# Patient Record
Sex: MALE | Race: WHITE | Employment: OTHER | URBAN - METROPOLITAN AREA
[De-identification: names, ages, dates, MRNs, and addresses within clinical notes are randomized per-mention and may not be internally consistent; named-entity substitution may affect disease eponyms.]

---

## 2018-01-05 ENCOUNTER — INITIAL CONSULT (OUTPATIENT)
Dept: CARDIOLOGY CLINIC | Age: 53
End: 2018-01-05

## 2018-01-05 ENCOUNTER — HOSPITAL ENCOUNTER (OUTPATIENT)
Dept: GENERAL RADIOLOGY | Age: 53
Discharge: HOME | End: 2018-01-05
Attending: INTERNAL MEDICINE | Admitting: INTERNAL MEDICINE

## 2018-01-05 VITALS
SYSTOLIC BLOOD PRESSURE: 126 MMHG | WEIGHT: 315 LBS | BODY MASS INDEX: 38.36 KG/M2 | HEIGHT: 76 IN | DIASTOLIC BLOOD PRESSURE: 82 MMHG | HEART RATE: 62 BPM

## 2018-01-05 DIAGNOSIS — R42 ORTHOSTATIC DIZZINESS: ICD-10-CM

## 2018-01-05 DIAGNOSIS — R07.2 PRECORDIAL PAIN: ICD-10-CM

## 2018-01-05 DIAGNOSIS — I48.19 PERSISTENT ATRIAL FIBRILLATION (HCC): Primary | ICD-10-CM

## 2018-01-05 LAB
BASOPHILS ABSOLUTE: 0 K/CU MM
BASOPHILS ABSOLUTE: ABNORMAL /ΜL
BASOPHILS RELATIVE PERCENT: 0.4 % (ref 0–1)
BASOPHILS RELATIVE PERCENT: ABNORMAL %
CHOLESTEROL: 241 MG/DL
DIFFERENTIAL TYPE: ABNORMAL
EOSINOPHILS ABSOLUTE: 0.2 K/CU MM
EOSINOPHILS ABSOLUTE: ABNORMAL /ΜL
EOSINOPHILS RELATIVE PERCENT: 2 % (ref 0–3)
EOSINOPHILS RELATIVE PERCENT: ABNORMAL %
HCT VFR BLD CALC: 35.2 % (ref 42–52)
HCT VFR BLD CALC: 36.2 % (ref 41–53)
HDLC SERPL-MCNC: 46 MG/DL
HEMOGLOBIN: 11.2 GM/DL (ref 13.5–18)
HEMOGLOBIN: 11.7 G/DL (ref 13.5–17.5)
IMMATURE NEUTROPHIL %: 0.4 % (ref 0–0.43)
IRON: 47 UG/DL (ref 59–158)
LDL CHOLESTEROL DIRECT: 169 MG/DL
LYMPHOCYTES ABSOLUTE: 1.8 K/CU MM
LYMPHOCYTES ABSOLUTE: ABNORMAL /ΜL
LYMPHOCYTES RELATIVE PERCENT: 21.4 % (ref 24–44)
LYMPHOCYTES RELATIVE PERCENT: ABNORMAL %
MCH RBC QN AUTO: 27.9 PG (ref 27–31)
MCH RBC QN AUTO: ABNORMAL PG
MCHC RBC AUTO-ENTMCNC: 31.8 % (ref 32–36)
MCHC RBC AUTO-ENTMCNC: ABNORMAL G/DL
MCV RBC AUTO: 87.8 FL (ref 78–100)
MCV RBC AUTO: ABNORMAL FL
MONOCYTES ABSOLUTE: 0.5 K/CU MM
MONOCYTES ABSOLUTE: ABNORMAL /ΜL
MONOCYTES RELATIVE PERCENT: 6.2 % (ref 0–4)
MONOCYTES RELATIVE PERCENT: ABNORMAL %
NEUTROPHILS ABSOLUTE: ABNORMAL /ΜL
NEUTROPHILS RELATIVE PERCENT: ABNORMAL %
NUCLEATED RBC %: 0 %
PCT TRANSFERRIN: 12 % (ref 10–44)
PDW BLD-RTO: 13.8 % (ref 11.7–14.9)
PLATELET # BLD: 368 K/CU MM (ref 140–440)
PLATELET # BLD: 374 K/ΜL
PMV BLD AUTO: 9.6 FL (ref 7.5–11.1)
PMV BLD AUTO: ABNORMAL FL
RBC # BLD: 4.01 M/CU MM (ref 4.6–6.2)
RBC # BLD: 4.2 10^6/ΜL
SEGMENTED NEUTROPHILS ABSOLUTE COUNT: 5.9 K/CU MM
SEGMENTED NEUTROPHILS RELATIVE PERCENT: 69.6 % (ref 36–66)
TOTAL IMMATURE NEUTOROPHIL: 0.03 K/CU MM
TOTAL IRON BINDING CAPACITY: 403 UG/DL (ref 250–450)
TOTAL NUCLEATED RBC: 0 K/CU MM
TRIGL SERPL-MCNC: 168 MG/DL
TSH SERPL DL<=0.05 MIU/L-ACNC: 1.98 UIU/ML
UNSATURATED IRON BINDING CAPACITY: 356 UG/DL (ref 110–370)
WBC # BLD: 8.5 K/CU MM (ref 4–10.5)
WBC # BLD: 8.95 10^3/ML

## 2018-01-05 PROCEDURE — 93000 ELECTROCARDIOGRAM COMPLETE: CPT | Performed by: INTERNAL MEDICINE

## 2018-01-05 PROCEDURE — 99204 OFFICE O/P NEW MOD 45 MIN: CPT | Performed by: INTERNAL MEDICINE

## 2018-01-05 NOTE — PROGRESS NOTES
medications of all above medical condition listed as is. Return in about 3 weeks (around 1/26/2018). Please note this report has been partially produced using speech recognition software and may contain errors related to that system including errors in grammar, punctuation, and spelling, as well as words and phrases that may be inappropriate.  If there are any questions or concerns please feel free to contact the dictating provider for clarification.

## 2018-01-08 ENCOUNTER — TELEPHONE (OUTPATIENT)
Dept: CARDIOLOGY CLINIC | Age: 53
End: 2018-01-08

## 2018-01-08 ENCOUNTER — PROCEDURE VISIT (OUTPATIENT)
Dept: CARDIOLOGY CLINIC | Age: 53
End: 2018-01-08

## 2018-01-08 DIAGNOSIS — R42 ORTHOSTATIC DIZZINESS: ICD-10-CM

## 2018-01-08 DIAGNOSIS — R07.2 PRECORDIAL PAIN: ICD-10-CM

## 2018-01-08 DIAGNOSIS — I48.19 PERSISTENT ATRIAL FIBRILLATION (HCC): Primary | ICD-10-CM

## 2018-01-08 DIAGNOSIS — I48.19 PERSISTENT ATRIAL FIBRILLATION (HCC): ICD-10-CM

## 2018-01-08 LAB
LV EF: 28 %
LV EF: 32 %
LVEF MODALITY: NORMAL
LVEF MODALITY: NORMAL

## 2018-01-08 PROCEDURE — 78452 HT MUSCLE IMAGE SPECT MULT: CPT | Performed by: INTERNAL MEDICINE

## 2018-01-08 PROCEDURE — 93306 TTE W/DOPPLER COMPLETE: CPT | Performed by: INTERNAL MEDICINE

## 2018-01-08 PROCEDURE — 93016 CV STRESS TEST SUPVJ ONLY: CPT | Performed by: INTERNAL MEDICINE

## 2018-01-08 PROCEDURE — A9500 TC99M SESTAMIBI: HCPCS | Performed by: INTERNAL MEDICINE

## 2018-01-08 PROCEDURE — 93018 CV STRESS TEST I&R ONLY: CPT | Performed by: INTERNAL MEDICINE

## 2018-01-08 PROCEDURE — 93017 CV STRESS TEST TRACING ONLY: CPT | Performed by: INTERNAL MEDICINE

## 2018-01-08 NOTE — TELEPHONE ENCOUNTER
Called the and talked with the patient about abn NM and Echo. Patient EF is low and he is scheduled to come and see Dr [email or web address].       Notes Recorded by Yared Garrison MD on 1/8/2018 at 3:12 PM EST  Needs to see me in office on Monday  To discuss plan , he will need cardaic cath , he should have seen GI to make sure he is not actively bleeding     NM Summary 1/8/18    A. Fib may affect EF results adversely    Supervising physician Dr. Tyler Morrissey .   Leopoldo Sas specific T wave inversion in lat leads but negative for ischemia by    diagnostic criteria    Abnormal reduced EF 32 % with global hypokinesis, patient was in afib during    exam    Moderate intensity medium size lat wall reversible defect in stress images    Fixed inferior defect consisted with diaphragmatic artifact    dilated left ventricle        Recommendation    Needs invasive work up and follow up in office for further discussion

## 2018-01-09 LAB
ALBUMIN SERPL-MCNC: 4.1 G/DL
ALP BLD-CCNC: 69 U/L
ALT SERPL-CCNC: 46 U/L
ANION GAP SERPL CALCULATED.3IONS-SCNC: NORMAL MMOL/L
AST SERPL-CCNC: 34 U/L
BILIRUB SERPL-MCNC: 0.7 MG/DL (ref 0.1–1.4)
BUN BLDV-MCNC: 14 MG/DL
CALCIUM SERPL-MCNC: 9.4 MG/DL
CHLORIDE BLD-SCNC: 103 MMOL/L
CHOLESTEROL, TOTAL: 233 MG/DL
CHOLESTEROL/HDL RATIO: ABNORMAL
CO2: 240 MMOL/L
CREAT SERPL-MCNC: 0.9 MG/DL
GFR CALCULATED: NORMAL
GLUCOSE BLD-MCNC: 76 MG/DL
HDLC SERPL-MCNC: 44 MG/DL (ref 35–70)
LDL CHOLESTEROL CALCULATED: 165 MG/DL (ref 0–160)
POTASSIUM SERPL-SCNC: 4.4 MMOL/L
SODIUM BLD-SCNC: 140 MMOL/L
TOTAL PROTEIN: 70
TRIGL SERPL-MCNC: 118 MG/DL
VLDLC SERPL CALC-MCNC: 24 MG/DL

## 2018-01-15 ENCOUNTER — OFFICE VISIT (OUTPATIENT)
Dept: CARDIOLOGY CLINIC | Age: 53
End: 2018-01-15

## 2018-01-15 VITALS
SYSTOLIC BLOOD PRESSURE: 130 MMHG | HEIGHT: 76 IN | BODY MASS INDEX: 38.36 KG/M2 | DIASTOLIC BLOOD PRESSURE: 80 MMHG | WEIGHT: 315 LBS | HEART RATE: 58 BPM

## 2018-01-15 DIAGNOSIS — R42 ORTHOSTATIC DIZZINESS: ICD-10-CM

## 2018-01-15 DIAGNOSIS — R07.2 PRECORDIAL PAIN: ICD-10-CM

## 2018-01-15 DIAGNOSIS — I48.19 PERSISTENT ATRIAL FIBRILLATION (HCC): Primary | ICD-10-CM

## 2018-01-15 DIAGNOSIS — I25.5 ISCHEMIC CARDIOMYOPATHY: ICD-10-CM

## 2018-01-15 DIAGNOSIS — R94.39 ABNORMAL STRESS TEST: ICD-10-CM

## 2018-01-15 DIAGNOSIS — D50.0 ANEMIA DUE TO GI BLOOD LOSS: ICD-10-CM

## 2018-01-15 PROCEDURE — 99214 OFFICE O/P EST MOD 30 MIN: CPT | Performed by: INTERNAL MEDICINE

## 2018-01-15 RX ORDER — ATORVASTATIN CALCIUM 20 MG/1
TABLET, FILM COATED ORAL
COMMUNITY
Start: 2018-01-10 | End: 2018-04-11

## 2018-01-15 RX ORDER — NITROGLYCERIN 0.4 MG/1
0.4 TABLET SUBLINGUAL EVERY 5 MIN PRN
Qty: 25 TABLET | Refills: 3 | Status: SHIPPED | OUTPATIENT
Start: 2018-01-15 | End: 2020-07-09 | Stop reason: ALTCHOICE

## 2018-01-15 NOTE — LETTER
PHYSICIAN SIGNATURE:      DATE:                                                          [address] Informed Consent for Anesthesia/Sedation, Surgery, Invasive Procedures, and other High-risk Interventions and Medication use      *This consent is applicable for 30 days following patient signature*    Procedure(s)   IDonnell authorize, Dr. Lidia Mustafa    and the associate(s) or assistant(s) of his/her choice, to perform the following procedure(s): [address]     I know that unexpected conditions may require additional or different procedures than those above. I authorize the above named practitioner(s) perform these as necessary and desirable. This is based on the practitioners professional judgment. The above named practitioner has discussed the above procedure(s) with me, including:  ? Potential benefits, including likelihood of success of the procedure(s) goals  ? Risks  ? Side effects, risk of death, and risk of infection  ? Any potential problems that might occur during recuperation or healing post-procedure  ? Reasonable alternatives  ? Risks of NOT performing the procedure(s)    I acknowledge that no warranty or guarantee has been made to the results the procedure(s). I consent to the above named practitioner(s) providing additional services to me as deemed reasonable and necessary, including but not limited to:    ? Use of medications for anesthesia or sedation. ? All anesthesia and sedation carry risks. My practitioner has discussed my anticipated anesthesia and/or sedation and the risks of using, risk of not using, benefits, side effects, and alternatives. ? Use of pathology  ? I authorize [address] to dispose of tissues, specimens or organs when pathology is complete. ? Use of radiology  ? A contrast agent may be required for radiology procedures.   My is a minor, or has a court-appointed Guardian:  Patients Representative Name (Print):  ____________________________________      Relationship ([place] one):    Guardian   Parent    Spouse    HCPOA   Child   Sibling  Next-of-Kin Friend    Patients Representative Signature: _______________________________________              Date: ______________  Time: __________    An  was used.  name/ID: _________________________________      [facility] ([facility] Witness________________________  Date: ________   Time: _________    Physician/Practitioner _______________________  Date: ________   Time: _________         Revision 2017        [facility] Hetal Velasco    PROCEDURE TO SCHEDULE:    LEFT HEART CATHETERIZATION WITH POSSIBLE PERCUTANEOUS CORONARY INTERVENTION       Patient Name: Ella Escalona   : 1965   MRN# [id number]    Home Phone Number: [phone]   Weight:    Wt Readings from Last 3 Encounters:   01/15/18 (!) 350 lb (158.8 kg)   18 (!) 350 lb (158.8 kg)        Insurance: Payor: Tigist Abdullahi / Plan: Tigist Abdullahi - [place] PPO / Product Type: *No Product type* /     Date of Procedure: 2018 Time: 9am Arrival Time: 7am    Diagnosis:    Allergies:    Allergies   Allergen Reactions    Penicillins      Cant remember his reaction         1) Call [facility] scheduling ([phone]) or [address]     PHONE OR   INSTANT MESSAGE  2) PREAUTHORIZATION NUMBER:    Spoke to:      From date:     expiration date:        Artem Garcia

## 2018-01-15 NOTE — LETTER
Transesophageal Echocardiogram  What is a transesophageal echocardiogram?  A transesophageal echocardiogram is a test to help your doctor look at the inside of your heart. A small device called a transducer directs sound waves toward your heart. The sound waves make a picture of the heart's valves and chambers. Your doctor may do this test to look for certain types of heart disease. Or it may be done to see how disease is affecting your heart. You will be given medicine to make you sleepy and comfortable during the test. The doctor puts a small, flexible tube into your throat and guides it to the esophagus. This is the tube that connects your mouth to your stomach. The doctor will ask you to swallow as the tube goes down. The transducer is at the tip of the tube. It gets close to your heart to make clear pictures. The doctor will look at the ultrasound pictures on a screen. You will not be able to eat or drink until the numbness from the throat spray wears off. Your throat may be sore for a few days after the test.  Follow-up care is a key part of your treatment and safety. Be sure to make and go to all appointments, and call your doctor if you are having problems. It's also a good idea to know your test results and keep a list of the medicines you take.                                     [address] [facility]/CARDIOLOGY          Patient Name: Joan Singh   : 1965  MRN# [id number]  TODAYS DATE: 1/15/2018    DATE OF PROCEDURE: 18    DX:         X Check if Patient has a Pacemaker or ICD   XTransesophageal Echocardiogram (FIDEL)   XCardioversion     X INR    X BMP          PHYSICIANS SIGNATURE ________________________ DATE/TIME___________________                                                   [facility] ([facility]) Informed Consent for Anesthesia/Sedation, Surgery, Invasive Procedures, and other High-risk Interventions and Medication use This includes appropriate portions of my body. My identity will not be revealed. ? I consent to release of my social security number and other identifying information to [employer] (FDA), and the supplier/, if I receive tissue, a device, or implant. This is to track the tissue, device, or implant for defect, recall, infection, etc.     ? Use of blood and/or blood products, if needed, through my hospital stay. My practitioner has advised me of the risks of using, risks of not using, benefits, side effects, and alternatives. ___ I do NOT want Blood or Blood products given. (Complete separate  refusal form)    Code Status (devan one):  ___ I do NOT HAVE a DNR order. I am a Full-code.   I will receive CPR, intubation,  chest compressions, medications, and/or other life saving measures if I have a  cardiac or respiratory arrest.    ___ I have a Do Not Resuscitate (DNR)order.   (devan one below)  ___  I rescind my DNR for surgery and immediate post-operative period through Phase 2 recovery. This means, for that time period, I will be a Full-code and receive CPR, intubation, chest compressions, medications, and/or other life saving measures, if I have a cardiac or respiratory arrest.    ___ I WANT to keep my DNR in effect during my procedure(s) and immediate post-operative recovery period through Phase 2 recovery. (Complete separate refusal form)     This form has been fully explained to me. I understand its contents.       Patients Signature: ___________________________Date: ________  Time: ________    If patient unable to sign, has engaged the [address] [place] [address], is a minor, or has a court-appointed Guardian:  [facility] Representative Name (Print):  ____________________________________      Relationship ([place] one):    Guardian   Parent    Spouse    HCPOA   Child   Sibling  Next-of-Kin Friend    Patients Representative Signature:

## 2018-01-15 NOTE — ASSESSMENT & PLAN NOTE
He is reduced, EF. He  is not volume overloaded. He will need cardiac catheterization. Once GI bleed is ruled out. Cardiomyopathy could be A. Fib related or ischemic. We will need to start him on medication. I would like to switch him to Toprol-XL and Coreg but he's already lightheaded when he stands up. Once I cardiovert him. We will add isinopril. Plan left heart cath, FIDEL, cardioversion after GI workup. Patient is intermediate risk, but can Undergo endoscopy.  To Rule out GI bleed before proceeding with heart cath or anticoagulation

## 2018-01-15 NOTE — PROGRESS NOTES
CARDIOLOGY  CONSULT NOTE    Chief Complaint: afib    HPI:   Willian Sheth is a 50y.o. year old who has history as noted below. He works as a campos. He comes in after abnormal stress test and started on treatment for atrial fibrillation with rate control. He has not started on any antiplatelet or anticoagulation due to concerns for GI bleed. He has been noticing lightheadedness and dizziness when he stands up. He also feels chest pressure and chest tightness when he is exerting himself He says taking out the garbage. He had to stop twice because of chest pressure, but he can flight climb a flight of stairs. He has noticed black stools recently, so he was worried that maybe he is having a gastric ulcer. He went to see his primary care physician  where an EKG revealed atrial fibrillation. He is planned to see gastroenterology later this week  He says the chest pressure is nonradiating. He does feel occasional palpitations. Current Outpatient Prescriptions   Medication Sig Dispense Refill    aspirin 81 MG tablet Take 81 mg by mouth daily      nitroGLYCERIN (NITROSTAT) 0.4 MG SL tablet Place 1 tablet under the tongue every 5 minutes as needed for Chest pain 25 tablet 3    atorvastatin (LIPITOR) 20 MG tablet       metoprolol tartrate (LOPRESSOR) 25 MG tablet Take 1 tablet by mouth 2 times daily 60 tablet 3     No current facility-administered medications for this visit.         Allergies:   Penicillins    Patient History:  Past Medical History:   Diagnosis Date    Atrial fibrillation ([address]     Breast mass     Dyspnea     Hypercholesterolemia     Melena     Snoring      Past Surgical History:   Procedure Laterality Date    CARPAL TUNNEL RELEASE Bilateral 1983    Carpal tunnel bilatera     Family History   Problem Relation Age of Onset    Hypotension Father     Diabetes Father     Asthma Maternal Grandmother      Social History   Substance Use Topics    Smoking for any problems, questions, or concerns. Continue all other medications of all above medical condition listed as is. Return in about 1 month (around 2/15/2018). Please note this report has been partially produced using speech recognition software and may contain errors related to that system including errors in grammar, punctuation, and spelling, as well as words and phrases that may be inappropriate.  If there are any questions or concerns please feel free to contact the dictating provider for clarification.

## 2018-01-15 NOTE — PROGRESS NOTES
Pt here in office & educated on Left heart cath for Dx: abn stress , scheduled for 1/24/18 @ 9am, w/arrival @ 7am, @ [facility]; risks explained; & consents signed. Pre-admission orders given to pt for labs & CXR due 1-2 @ [facility]. Instructions given to pt to:  NPO after midnight night before procedure; Call hospital @ [phone] to pre-register; May take rest of morning meds. am of procedure; & pt voiced understanding. Copies of consent, pre-testing orders, & info. sheet given to [facility] for scanning.

## 2018-01-22 ENCOUNTER — HOSPITAL ENCOUNTER (OUTPATIENT)
Dept: GENERAL RADIOLOGY | Age: 53
Discharge: HOME | End: 2018-01-22
Attending: INTERNAL MEDICINE | Admitting: INTERNAL MEDICINE

## 2018-01-22 DIAGNOSIS — Z01.818 PRE-OP EXAM: ICD-10-CM

## 2018-01-22 LAB
ANION GAP SERPL CALCULATED.3IONS-SCNC: 11 MMOL/L (ref 4–16)
APTT: 22.8 SECONDS (ref 21.2–33)
BUN BLDV-MCNC: 18 MG/DL (ref 6–23)
CALCIUM SERPL-MCNC: 9.2 MG/DL (ref 8.3–10.6)
CHLORIDE BLD-SCNC: 105 MMOL/L (ref 99–110)
CO2: 26 MMOL/L (ref 21–32)
CREAT SERPL-MCNC: 0.9 MG/DL (ref 0.9–1.3)
EKG ATRIAL RATE: 357 BPM
EKG DIAGNOSIS: NORMAL
EKG Q-T INTERVAL: 322 MS
EKG QRS DURATION: 94 MS
EKG QTC CALCULATION (BAZETT): 411 MS
EKG R AXIS: 69 DEGREES
EKG T AXIS: 242 DEGREES
EKG VENTRICULAR RATE: 98 BPM
GFR AFRICAN AMERICAN: >60 ML/MIN/1.73M2
GFR NON-AFRICAN AMERICAN: >60 ML/MIN/1.73M2
GLUCOSE BLD-MCNC: 91 MG/DL (ref 70–99)
HCT VFR BLD CALC: 39.5 % (ref 42–52)
HEMOGLOBIN: 12.5 GM/DL (ref 13.5–18)
INR BLD: 1.04 INDEX
MCH RBC QN AUTO: 26.7 PG (ref 27–31)
MCHC RBC AUTO-ENTMCNC: 31.6 % (ref 32–36)
MCV RBC AUTO: 84.2 FL (ref 78–100)
PDW BLD-RTO: 13.1 % (ref 11.7–14.9)
PLATELET # BLD: 307 K/CU MM (ref 140–440)
PMV BLD AUTO: 9.7 FL (ref 7.5–11.1)
POTASSIUM SERPL-SCNC: 4.8 MMOL/L (ref 3.5–5.1)
PROTHROMBIN TIME: 11.9 SECONDS (ref 9.12–12.5)
RBC # BLD: 4.69 M/CU MM (ref 4.6–6.2)
SODIUM BLD-SCNC: 142 MMOL/L (ref 135–145)
WBC # BLD: 7.2 K/CU MM (ref 4–10.5)

## 2018-01-24 ENCOUNTER — HOSPITAL ENCOUNTER (OUTPATIENT)
Dept: CARDIOLOGY | Age: 53
Discharge: HOME | End: 2018-02-22
Attending: INTERNAL MEDICINE | Admitting: INTERNAL MEDICINE

## 2018-01-24 LAB
LV EF: 28 %
LVEF MODALITY: NORMAL

## 2018-01-24 ASSESSMENT — ENCOUNTER SYMPTOMS: SHORTNESS OF BREATH: 1

## 2018-01-24 NOTE — ANESTHESIA POST-OP
Anesthesia Post-op Note    Patient: Alexa Garcia  MRN: [id number]  YOB: 1965  Date of evaluation: 1/24/2018  Time:  9:44 AM     Procedure(s) Performed:     Last Vitals: There were no vitals taken for this visit.     Trev Phase I:      Trev Phase II:      Anesthesia Post Evaluation    Final anesthesia type: MAC  Patient location during evaluation: procedure area  Patient participation: complete - patient participated  Level of consciousness: awake and alert  Pain score: 0  Airway patency: patent  Nausea & Vomiting: no nausea and no vomiting  Complications: no  Cardiovascular status: blood pressure returned to baseline  Respiratory status: acceptable, spontaneous ventilation, room air and nonlabored ventilation  Hydration status: euvolemic        Arlene Rich CRNA  9:44 AM

## 2018-01-24 NOTE — ANESTHESIA PRE-OP
Alcohol use Yes                                Counseling given: Not Answered      Vital Signs (Current): There were no vitals filed for this visit. BP Readings from Last 3 Encounters:   01/24/18 136/83   01/15/18 130/80   01/05/18 126/82       NPO Status:                                                                                 BMI:   Wt Readings from Last 3 Encounters:   01/15/18 (!) 350 lb (158.8 kg)   01/05/18 (!) 350 lb (158.8 kg)     There is no height or weight on file to calculate BMI. Anesthesia Evaluation  Patient summary reviewed  Airway: Mallampati: III  TM distance: >3 FB   Neck ROM: full  Mouth opening: > = 3 FB Dental:          Pulmonary:   (+) shortness of breath:                             Cardiovascular:    (+) dysrhythmias: atrial fibrillation,       ECG reviewed      Echocardiogram reviewed         Beta Blocker:  No for medical reason      ROS comment:  Atrial fibrillation.   Left ventricular systolic function is mildly depressed with an ejection   fraction of 25 to 30 %   Mild concentric left ventricular hypertrophy.   Left Ventricular size is dilated.   Moderate bilateral atrial dilatation.   Doppler evaluation reveals mild aortic, mitral, tricuspid regurgitation.   No evidence of pericardial effusion. Neuro/Psych:               GI/Hepatic/Renal:        (-) GERD       Endo/Other:                     Abdominal:   (+) obese,         Vascular:                                        Anesthesia Plan      MAC     ASA 3       Induction: intravenous. Anesthetic plan and risks discussed with patient.                       Lexx Taylor CRNA   1/24/2018

## 2018-01-26 ENCOUNTER — OFFICE VISIT (OUTPATIENT)
Dept: CARDIOLOGY CLINIC | Age: 53
End: 2018-01-26

## 2018-01-26 VITALS
HEIGHT: 76 IN | HEART RATE: 70 BPM | BODY MASS INDEX: 38.36 KG/M2 | WEIGHT: 315 LBS | SYSTOLIC BLOOD PRESSURE: 124 MMHG | DIASTOLIC BLOOD PRESSURE: 86 MMHG

## 2018-01-26 DIAGNOSIS — R94.39 ABNORMAL STRESS TEST: ICD-10-CM

## 2018-01-26 DIAGNOSIS — I48.0 PAROXYSMAL ATRIAL FIBRILLATION (HCC): Primary | ICD-10-CM

## 2018-01-26 DIAGNOSIS — R07.2 PRECORDIAL PAIN: ICD-10-CM

## 2018-01-26 DIAGNOSIS — I48.19 PERSISTENT ATRIAL FIBRILLATION (HCC): ICD-10-CM

## 2018-01-26 DIAGNOSIS — I25.5 ISCHEMIC CARDIOMYOPATHY: ICD-10-CM

## 2018-01-26 PROCEDURE — 93000 ELECTROCARDIOGRAM COMPLETE: CPT | Performed by: INTERNAL MEDICINE

## 2018-01-26 PROCEDURE — 99214 OFFICE O/P EST MOD 30 MIN: CPT | Performed by: INTERNAL MEDICINE

## 2018-01-26 RX ORDER — OMEPRAZOLE 40 MG/1
CAPSULE, DELAYED RELEASE ORAL
COMMUNITY
Start: 2018-01-18 | End: 2018-04-11

## 2018-01-26 RX ORDER — METOPROLOL SUCCINATE 25 MG/1
25 TABLET, EXTENDED RELEASE ORAL DAILY
Qty: 30 TABLET | Refills: 3 | Status: SHIPPED | OUTPATIENT
Start: 2018-01-26 | End: 2018-02-28 | Stop reason: ALTCHOICE

## 2018-01-26 NOTE — PROGRESS NOTES
Grandmother      Social History   Substance Use Topics    Smoking status: Never Smoker    Smokeless tobacco: Never Used    Alcohol use Yes      Comment: very little         Review of Systems:   · Constitutional: No Fever or Weight Loss   · Eyes: No Decreased Vision  · ENT: No Headaches, Hearing Loss or Vertigo  · Cardiovascular: as per note above   · Respiratory: No cough or wheezing and as per note above. · Gastrointestinal: No abdominal pain, appetite loss, blood in stools, constipation, diarrhea or heartburn  · Genitourinary: No dysuria, trouble voiding, or hematuria  · Musculoskeletal:  None  · Integumentary: No rash or pruritis  · Neurological: No TIA or stroke symptoms  · Psychiatric: No anxiety or depression  · Endocrine: No malaise, fatigue or temperature intolerance  · Hematologic/Lymphatic: No bleeding problems, blood clots or swollen lymph nodes  · Allergic/Immunologic: No nasal congestion or hives    Objective:      Physical Exam:  /86   Pulse 70   Ht 6' 4\" (1.93 m)   Wt (!) 357 lb (161.9 kg)   BMI 43.46 kg/m²   Wt Readings from Last 3 Encounters:   01/26/18 (!) 357 lb (161.9 kg)   01/15/18 (!) 350 lb (158.8 kg)   01/05/18 (!) 350 lb (158.8 kg)     Body mass index is 43.46 kg/m². Vitals:    01/26/18 0809   BP: 124/86   Pulse: 70        General Appearance:  No distress, conversant  Constitutional:  Well developed, Well nourished, No acute distress, Non-toxic appearance. HENT:  Normocephalic, Atraumatic, Bilateral external ears normal, Oropharynx moist, No oral exudates, Nose normal. Neck- Normal range of motion, No tenderness, Supple, No stridor,no apical-carotid delay  Eyes:  PERRL, EOMI, Conjunctiva normal, No discharge. Respiratory:  Normal breath sounds, No respiratory distress, No wheezing, No chest tenderness. ,no use of accessory muscles,   Cardiovascular: (PMI) apex non displaced,no lifts no thrills,S1 and S2 audible, No added heart sounds, No signs of ankle edema, or volume

## 2018-02-16 NOTE — TELEPHONE ENCOUNTER
From: Hermes Guevara  Sent: 2/15/2018 9:19 PM EST  Subject: Medication Renewal Request    Elia Sharma would like a refill of the following medications:  sacubitril-valsartan (ENTRESTO) 24-26 MG per tablet Bulmaro Pires MD]    Preferred pharmacy: [place] [address] [address] [phone] - F [phone]    Comment:  I received samples in the office which will get me through mid week. Need a prescription called into my [facility] in [place], [place]. Thank you!

## 2018-02-28 ENCOUNTER — OFFICE VISIT (OUTPATIENT)
Dept: CARDIOLOGY CLINIC | Age: 53
End: 2018-02-28

## 2018-02-28 VITALS
WEIGHT: 315 LBS | BODY MASS INDEX: 38.36 KG/M2 | HEART RATE: 64 BPM | SYSTOLIC BLOOD PRESSURE: 120 MMHG | HEIGHT: 76 IN | DIASTOLIC BLOOD PRESSURE: 64 MMHG

## 2018-02-28 DIAGNOSIS — R07.2 PRECORDIAL PAIN: ICD-10-CM

## 2018-02-28 DIAGNOSIS — I25.5 ISCHEMIC CARDIOMYOPATHY: Primary | ICD-10-CM

## 2018-02-28 DIAGNOSIS — R94.39 ABNORMAL STRESS TEST: ICD-10-CM

## 2018-02-28 DIAGNOSIS — I48.0 PAROXYSMAL ATRIAL FIBRILLATION (HCC): ICD-10-CM

## 2018-02-28 PROCEDURE — 99214 OFFICE O/P EST MOD 30 MIN: CPT | Performed by: INTERNAL MEDICINE

## 2018-02-28 RX ORDER — METOPROLOL SUCCINATE 50 MG/1
50 TABLET, EXTENDED RELEASE ORAL DAILY
Qty: 30 TABLET | Refills: 3 | Status: SHIPPED | OUTPATIENT
Start: 2018-02-28 | End: 2018-03-21 | Stop reason: SDUPTHER

## 2018-02-28 NOTE — PROGRESS NOTES
Patient here in office and educated on [facility], schedule for 03/14/2018 @ 0800, with arrival @ 0600, @ [facility]; risk explained; and consents signed. Also copy of orders given for labs and CXR due 03/13/2018 at [facility]. Instruction given to patient to :  NPO after midnight the night before procedure; call hospital at [phone] to pre-register. May take rest of morning meds of procedure except hold eliquis 48 hours. . Patient voiced understanding. Copies of consent & info sheet given to [facility] for scanning.

## 2018-02-28 NOTE — LETTER
Phone: [phone] Hours: 7:00 am to 5:00 pm Monday  Friday      PHYSICIAN SIGNATURE:      DATE:                                                          [facility]) Informed Consent for Anesthesia/Sedation, Surgery, Invasive Procedures, and other High-risk Interventions and Medication use      *This consent is applicable for 30 days following patient signature*    Procedure(s)   Kevin SHERMAN authorize, Dr. Sai Cesar    and the associate(s) or assistant(s) of his/her choice, to perform the following procedure(s): [address] [facility] [address]     I know that unexpected conditions may require additional or different procedures than those above. I authorize the above named practitioner(s) perform these as necessary and desirable. This is based on the practitioners professional judgment. The above named practitioner has discussed the above procedure(s) with me, including:  ? Potential benefits, including likelihood of success of the procedure(s) goals  ? Risks  ? Side effects, risk of death, and risk of infection  ? Any potential problems that might occur during recuperation or healing post-procedure  ? Reasonable alternatives  ? Risks of NOT performing the procedure(s)    I acknowledge that no warranty or guarantee has been made to the results the procedure(s). I consent to the above named practitioner(s) providing additional services to me as deemed reasonable and necessary, including but not limited to:    ? Use of medications for anesthesia or sedation. ? All anesthesia and sedation carry risks. My practitioner has discussed my anticipated anesthesia and/or sedation and the risks of using, risk of not using, benefits, side effects, and alternatives. ? Use of pathology  ? I authorize [facility]) to dispose of tissues, specimens or organs when pathology is complete. ?  Use of radiology ? A contrast agent may be required for radiology procedures. My practitioner has advised me of the risks of using, risks of not using, benefits, side effects, and alternatives. ? Observers or use of photography, video/audio recording, or televising of the procedure(s). This is for medical, scientific, or educational purposes. This includes appropriate portions of my body. My identity will not be revealed. ? I consent to release of my social security number and other identifying information to [employer] (FDA), and the supplier/, if I receive tissue, a device, or implant. This is to track the tissue, device, or implant for defect, recall, infection, etc.     ? Use of blood and/or blood products, if needed, through my hospital stay. My practitioner has advised me of the risks of using, risks of not using, benefits, side effects, and alternatives. ___ I do NOT want Blood or Blood products given. (Complete separate  refusal form)    Code Status (devan one):  ___ I do NOT HAVE a DNR order. I am a Full-code.   I will receive CPR, intubation,  chest compressions, medications, and/or other life saving measures if I have a  cardiac or respiratory arrest.    ___ I have a Do Not Resuscitate (DNR)order.   (devan one below)  ___  I rescind my DNR for surgery and immediate post-operative period through Phase 2 recovery. This means, for that time period, I will be a Full-code and receive CPR, intubation, chest compressions, medications, and/or other life saving measures, if I have a cardiac or respiratory arrest.    ___ I WANT to keep my DNR in effect during my procedure(s) and immediate post-operative recovery period through Phase 2 recovery. (Complete separate refusal form)     This form has been fully explained to me. I understand its contents.       Patients Signature: ___________________________Date: ________  Time: ________ If patient unable to sign, has engaged the [employer], is a minor, or has a court-appointed Guardian:  [facility] Representative Name (Print):  ____________________________________      Relationship ([place] one):    Guardian   Parent    Spouse    HCPOA   Child   Sibling  Next-of-Kin Friend    Patients Representative Signature: _______________________________________              Date: ______________  Time: __________    An  was used.  name/ID: _________________________________      [facility]) Witness________________________  Date: ________   Time: _________    Physician/Practitioner _______________________  Date: ________   Time: _________           Revision 2017                                                                                                      [facility] Hetal Velasco    PROCEDURE TO SCHEDULE:    LEFT HEART CATHETERIZATION WITH POSSIBLE PERCUTANEOUS CORONARY INTERVENTION       Patient Name: Ella Escalona   : 1965   MRN# [id number]    Home Phone Number: [phone]   Weight:    Wt Readings from Last 3 Encounters:   18 (!) 344 lb 12.8 oz (156.4 kg)   18 (!) 357 lb (161.9 kg)   01/15/18 (!) 350 lb (158.8 kg)        Insurance: Payor: [employer] / Plan: [employer] - [place] PPO / Product Type: *No Product type* /     Date of Procedure: 2018 Time: 0800 Arrival Time: 0600    Diagnosis:  Abn nm  Allergies:    Allergies   Allergen Reactions    Penicillins      Cant remember his reaction         1) Call [facility] scheduling ([phone]) or [address] [facility]     PHONE OR   INSTANT MESSAGE  2) PREAUTHORIZATION NUMBER:    Spoke to:      From date:     expiration date:        Jessie Paniagua

## 2018-02-28 NOTE — PROGRESS NOTES
CARDIOLOGY  NOTE    Chief Complaint: afib    HPI:   Amparo Beltran is a 50y.o. year old who has history as noted below. HE is s/p cardioversion and he is in sinus now. WE did not do cardiac cath due to gastric ulcers . HE was advised to hold of on aspirin for 10 days y gastroentolrogy so we have put off [address] until he is cleared to take aspirin . 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 He comes had an  abnormal stress test and started on treatment for atrial fibrillation with rate control. He is feeling better after after cardioversion   He has been noticing lightheadedness and dizziness when he stands up. He also feels chest pressure and chest tightness has significantly improved . He si walkign every day and building tolerance . Current Outpatient Prescriptions   Medication Sig Dispense Refill    sacubitril-valsartan (ENTRESTO) 24-26 MG per tablet Take 1 tablet by mouth 2 times daily 60 tablet 3    omeprazole (PRILOSEC) 40 MG delayed release capsule       metoprolol succinate (TOPROL XL) 25 MG extended release tablet Take 1 tablet by mouth daily 30 tablet 3    apixaban (ELIQUIS) 5 MG TABS tablet Take 1 tablet by mouth 2 times daily 56 tablet 0    atorvastatin (LIPITOR) 20 MG tablet       aspirin 81 MG tablet Take 81 mg by mouth daily      nitroGLYCERIN (NITROSTAT) 0.4 MG SL tablet Place 1 tablet under the tongue every 5 minutes as needed for Chest pain 25 tablet 3     No current facility-administered medications for this visit.         Allergies:   Penicillins    Patient History:  Past Medical History:   Diagnosis Date    Atrial fibrillation ([address]     Breast mass     Dyspnea     History of stress test 01/08/2018    EF 32% with global hypokinesis pt was in afib during exam. needs invasive work up for further discussion    Hypercholesterolemia     Melena     Snoring      Past Surgical History:   Procedure Laterality Date    CARPAL TUNNEL RELEASE reversible defect in stress images    Fixed inferior defect consisted with diaphragmatic artifact    dilated left ventricle         All labs, medications and tests reviewed by myself including data and history from outside source , patient and available family . Assessment & Plan:      1. Ischemic cardiomyopathy    2. Abnormal stress test    3. Paroxysmal atrial fibrillation (HCC)    4. Precordial pain       Ischemic cardiomyopathy  He is reduced, EF. He  is not volume overloaded. He will need cardiac catheterization. Once GI bleed is ruled out. Cardiomyopathy could be A. Fib related or ischemic. We will need to start him on medication. I would like to switch him to Toprol-XL  . Continue  Entresto, plan cardiac cath in next week. Radial access, alternates and benefits were discussed he is in agreement , understands risks of  But not limited to possible cva, renal failure or death     Persistent atrial fibrillation ([address]  He reports feeling lightheaded and dizzy. He has palpitations. .  His chads score is 0 but continue elequis due to post cardioversion was on Jan 24th     Precordial pain  Reports chest tightness with no radiation she with exertion. He has abnormal stress test plan cardiac cath once he is safe from Gi point > He is not bleeding , HCT is stable     Orthostatic dizziness  He reports feeling dizzy when he stands up, although today he was not orthostatic in the office     Dyslipidemia :  Chon Amin had lab work recently,  Lipid profile was reviewed with patient. Counseled extensively and medication compliance urged. We discussed that for the  prevention of ASCVD our  goal is aggressive risk modification. Patient is encouraged to exercise even a brisk walk for 30 minutes  at least 3 to 4 times a week   Various goals were discussed and questions answered. Continue current medications. Appropriate prescriptions are addressed and refills ordered.   Questions answered and patient verbalizes

## 2018-03-13 ENCOUNTER — HOSPITAL ENCOUNTER (OUTPATIENT)
Dept: GENERAL RADIOLOGY | Age: 53
Discharge: HOME | End: 2018-03-13
Attending: INTERNAL MEDICINE | Admitting: INTERNAL MEDICINE

## 2018-03-13 DIAGNOSIS — Z01.818 PRE-OP EXAM: ICD-10-CM

## 2018-03-13 LAB
ANION GAP SERPL CALCULATED.3IONS-SCNC: 12 MMOL/L (ref 4–16)
APTT: 23 SECONDS (ref 21.2–33)
BASOPHILS ABSOLUTE: 0 K/CU MM
BASOPHILS RELATIVE PERCENT: 0.5 % (ref 0–1)
BUN BLDV-MCNC: 20 MG/DL (ref 6–23)
CALCIUM SERPL-MCNC: 9.3 MG/DL (ref 8.3–10.6)
CHLORIDE BLD-SCNC: 103 MMOL/L (ref 99–110)
CO2: 27 MMOL/L (ref 21–32)
CREAT SERPL-MCNC: 1 MG/DL (ref 0.9–1.3)
DIFFERENTIAL TYPE: ABNORMAL
EOSINOPHILS ABSOLUTE: 0.1 K/CU MM
EOSINOPHILS RELATIVE PERCENT: 1.7 % (ref 0–3)
GFR AFRICAN AMERICAN: >60 ML/MIN/1.73M2
GFR NON-AFRICAN AMERICAN: >60 ML/MIN/1.73M2
GLUCOSE BLD-MCNC: 82 MG/DL (ref 70–99)
HCT VFR BLD CALC: 42.5 % (ref 42–52)
HEMOGLOBIN: 13.2 GM/DL (ref 13.5–18)
IMMATURE NEUTROPHIL %: 0 % (ref 0–0.43)
INR BLD: 1.08 INDEX
LYMPHOCYTES ABSOLUTE: 2.2 K/CU MM
LYMPHOCYTES RELATIVE PERCENT: 29.5 % (ref 24–44)
MCH RBC QN AUTO: 25.4 PG (ref 27–31)
MCHC RBC AUTO-ENTMCNC: 31.1 % (ref 32–36)
MCV RBC AUTO: 81.7 FL (ref 78–100)
MONOCYTES ABSOLUTE: 0.6 K/CU MM
MONOCYTES RELATIVE PERCENT: 7.4 % (ref 0–4)
NUCLEATED RBC %: 0 %
PDW BLD-RTO: 14.6 % (ref 11.7–14.9)
PLATELET # BLD: 318 K/CU MM (ref 140–440)
PMV BLD AUTO: 9.7 FL (ref 7.5–11.1)
POTASSIUM SERPL-SCNC: 4.9 MMOL/L (ref 3.5–5.1)
PROTHROMBIN TIME: 12.3 SECONDS (ref 9.12–12.5)
RBC # BLD: 5.2 M/CU MM (ref 4.6–6.2)
SEGMENTED NEUTROPHILS ABSOLUTE COUNT: 4.6 K/CU MM
SEGMENTED NEUTROPHILS RELATIVE PERCENT: 60.9 % (ref 36–66)
SODIUM BLD-SCNC: 142 MMOL/L (ref 135–145)
TOTAL IMMATURE NEUTOROPHIL: 0 K/CU MM
TOTAL NUCLEATED RBC: 0 K/CU MM
WBC # BLD: 7.5 K/CU MM (ref 4–10.5)

## 2018-03-14 ENCOUNTER — TELEPHONE (OUTPATIENT)
Dept: CARDIOLOGY CLINIC | Age: 53
End: 2018-03-14

## 2018-03-14 PROBLEM — R94.39 ABNORMAL STRESS TEST: Status: RESOLVED | Noted: 2018-01-15 | Resolved: 2018-03-14

## 2018-03-14 PROBLEM — I25.5 ISCHEMIC CARDIOMYOPATHY: Status: RESOLVED | Noted: 2018-01-15 | Resolved: 2018-03-14

## 2018-03-14 NOTE — TELEPHONE ENCOUNTER
Left message for patient need to schedule consult with Dr Zac Guo per Dr Tricia Keene to discuss afib ablation.  Ask for Nell May

## 2018-03-15 ENCOUNTER — TELEPHONE (OUTPATIENT)
Dept: CARDIOLOGY CLINIC | Age: 53
End: 2018-03-15

## 2018-03-15 NOTE — TELEPHONE ENCOUNTER
Left message for patient on home phone & cell, Dr Becca De La Cruz would like patient to see Dr Kathrine Tejada next week for A fib.  Please call Valeriano Patrick

## 2018-03-19 ENCOUNTER — PATIENT MESSAGE (OUTPATIENT)
Dept: CARDIOLOGY CLINIC | Age: 53
End: 2018-03-19

## 2018-03-19 ENCOUNTER — TELEPHONE (OUTPATIENT)
Dept: CARDIOLOGY CLINIC | Age: 53
End: 2018-03-19

## 2018-03-19 DIAGNOSIS — I42.0 DILATED CARDIOMYOPATHY (HCC): Primary | ICD-10-CM

## 2018-03-19 DIAGNOSIS — I48.0 PAROXYSMAL ATRIAL FIBRILLATION (HCC): ICD-10-CM

## 2018-03-21 ENCOUNTER — OFFICE VISIT (OUTPATIENT)
Dept: CARDIOLOGY CLINIC | Age: 53
End: 2018-03-21

## 2018-03-21 VITALS
DIASTOLIC BLOOD PRESSURE: 80 MMHG | WEIGHT: 315 LBS | HEIGHT: 76 IN | HEART RATE: 78 BPM | SYSTOLIC BLOOD PRESSURE: 118 MMHG | BODY MASS INDEX: 38.36 KG/M2

## 2018-03-21 DIAGNOSIS — I48.0 PAROXYSMAL ATRIAL FIBRILLATION (HCC): Primary | ICD-10-CM

## 2018-03-21 DIAGNOSIS — I42.0 DILATED CARDIOMYOPATHY (HCC): ICD-10-CM

## 2018-03-21 DIAGNOSIS — R07.2 PRECORDIAL PAIN: ICD-10-CM

## 2018-03-21 PROCEDURE — 99214 OFFICE O/P EST MOD 30 MIN: CPT | Performed by: INTERNAL MEDICINE

## 2018-03-21 RX ORDER — METOPROLOL SUCCINATE 50 MG/1
100 TABLET, EXTENDED RELEASE ORAL DAILY
Qty: 30 TABLET | Refills: 3 | Status: SHIPPED | OUTPATIENT
Start: 2018-03-21 | End: 2018-05-15 | Stop reason: DRUGHIGH

## 2018-03-21 NOTE — PROGRESS NOTES
Father     Diabetes Father     Asthma Maternal Grandmother      Social History   Substance Use Topics    Smoking status: Never Smoker    Smokeless tobacco: Never Used    Alcohol use Yes      Comment: very little         Review of Systems:   · Constitutional: No Fever or Weight Loss   · Eyes: No Decreased Vision  · ENT: No Headaches, Hearing Loss or Vertigo  · Cardiovascular: as per note above   · Respiratory: No cough or wheezing and as per note above. · Gastrointestinal: No abdominal pain, appetite loss, blood in stools, constipation, diarrhea or heartburn  · Genitourinary: No dysuria, trouble voiding, or hematuria  · Musculoskeletal:  None  · Integumentary: No rash or pruritis  · Neurological: No TIA or stroke symptoms  · Psychiatric: No anxiety or depression  · Endocrine: No malaise, fatigue or temperature intolerance  · Hematologic/Lymphatic: No bleeding problems, blood clots or swollen lymph nodes  · Allergic/Immunologic: No nasal congestion or hives    Objective:      Physical Exam:  /80 (Site: Right Arm, Position: Sitting)   Pulse 78   Ht 6' 4\" (1.93 m)   Wt (!) 348 lb (157.9 kg)   BMI 42.36 kg/m²   Wt Readings from Last 3 Encounters:   03/21/18 (!) 348 lb (157.9 kg)   03/14/18 (!) 344 lb (156 kg)   02/28/18 (!) 344 lb 12.8 oz (156.4 kg)     Body mass index is 42.36 kg/m². Vitals:    03/21/18 1658   BP: 118/80   Pulse: 78        General Appearance:  No distress, conversant  Constitutional:  Well developed, Well nourished, No acute distress, Non-toxic appearance. HENT:  Normocephalic, Atraumatic, Bilateral external ears normal, Oropharynx moist, No oral exudates, Nose normal. Neck- Normal range of motion, No tenderness, Supple, No stridor,no apical-carotid delay  Eyes:  PERRL, EOMI, Conjunctiva normal, No discharge. Respiratory:  Normal breath sounds, No respiratory distress, No wheezing, No chest tenderness. ,no use of accessory muscles,   Cardiovascular: (PMI) apex non displaced,no lifts no thrills,S1 and S2 audible, No added heart sounds, No signs of ankle edema, or volume overload, No evidence of JVD  GI:  Bowel sounds normal, Soft, No tenderness, No masses, No gross visceromegaly   :  No costovertebral angle tenderness   Musculoskeletal:  No edema, no tenderness, no deformities. Back- no tenderness  Integument:  Well hydrated, no rash   Lymphatic:  No lymphadenopathy noted   Neurologic:  Alert & oriented x 3, CN 2-12 normal, normal motor function, normal sensory function, no focal deficits noted   Psychiatric:  Speech and behavior appropriate       Medical decision making and Data review:  DATA:  No results found for: TROPONINT  BNP:  No results found for: PROBNP  PT/INR:  No results found for: PTINR  No results found for: LABA1C  Lab Results   Component Value Date    CHOL 233 01/08/2018    TRIG 118 01/08/2018    HDL 44 01/08/2018    LDLCALC 165 (A) 01/08/2018    LDLDIRECT 169 (H) 01/05/2018     Lab Results   Component Value Date    ALT 46 01/05/2018    AST 34 01/05/2018     TSH:   Lab Results   Component Value Date    TSH 1.98 01/05/2018     Echo 1/8/18   Summary   Atrial fibrillation.   Left ventricular systolic function is mildly depressed with an ejection   fraction of 25 to 30 %   Mild concentric left ventricular hypertrophy.   Left Ventricular size is dilated.   Moderate bilateral atrial dilatation.   Doppler evaluation reveals mild aortic, mitral, tricuspid regurgitation.   No evidence of pericardial effusion.    stress test 1/8/18  Summary    A. Fib may affect EF results adversely    Supervising physician Dr. Sumanth Aguirre .   Ajnu Chalet specific T wave inversion in lat leads but negative for ischemia by    diagnostic criteria    Abnormal reduced EF 32 % with global hypokinesis, patient was in afib during    exam    Moderate intensity medium size lat wall reversible defect in stress images    Fixed inferior defect consisted with diaphragmatic artifact    dilated left ventricle   Cath 3/14/18  Procedure Summary   Radial Access, Normal coronaries. LVEDp was 18 mmHG   No significant CAD, Right dominant system   Pt noted to be in afib      Angiographic Findings    Diagnostic Findings    Cardiac Arteries and Lesion Findings     LMCA: Normal and No Angiographicalyl Significant Disease. LAD: Normal (no stenosis %) and No Angiographicalyl Significant Disease. Type  III vessel , 2 large diagonal are widely patent  LCx: widely patent  RCA: Normal (no stenosis %) and No Angiographicalyl Significant Disease. gives  PDA , it is widely patent , right dominant vessel, large calibre vessel      All labs, medications and tests reviewed by myself including data and history from outside source , patient and available family . Assessment & Plan:      1. Paroxysmal atrial fibrillation (HCC)    2. Precordial pain    3. Dilated cardiomyopathy ([address]       Non Ischemic cardiomyopathy  No significant CAD   Once GI bleed is ruled out. Cardiomyopathy could be A. Fib related or non ischemic.  continueToprol-XL  . Increase  Entresto dose , get limited echo before he sees EPS to decide about ICD/ BIV    Persistent atrial fibrillation ([address]  failed cardioversion , continue amiodarone in case Dr Kitchen Angry wants to reattempts cardioversion. He has failed cardioversion twice  ( jan 2018 and march 2018). He reports feeling lightheaded and dizzy. He has palpitations. .  His chads score is 0 but continue elequis due to post cardioversion was on Jan 24th     Precordial pain  Cath shows normal cath     Orthostatic dizziness  He reports feeling dizzy when he stands up, although today he was not orthostatic in the office     Dyslipidemia :  Cristian Barton had lab work recently,  Lipid profile was reviewed with patient. Counseled extensively and medication compliance urged. We discussed that for the  prevention of ASCVD our  goal is aggressive risk modification. Patient is encouraged to exercise even a brisk walk for 30 minutes  at least 3 to 4

## 2018-04-05 ENCOUNTER — PROCEDURE VISIT (OUTPATIENT)
Dept: CARDIOLOGY CLINIC | Age: 53
End: 2018-04-05

## 2018-04-05 DIAGNOSIS — I48.0 PAROXYSMAL ATRIAL FIBRILLATION (HCC): ICD-10-CM

## 2018-04-05 DIAGNOSIS — R07.2 PRECORDIAL PAIN: ICD-10-CM

## 2018-04-05 DIAGNOSIS — I42.0 DILATED CARDIOMYOPATHY (HCC): Primary | ICD-10-CM

## 2018-04-05 PROCEDURE — 93308 TTE F-UP OR LMTD: CPT | Performed by: INTERNAL MEDICINE

## 2018-04-06 ENCOUNTER — TELEPHONE (OUTPATIENT)
Dept: CARDIOLOGY CLINIC | Age: 53
End: 2018-04-06

## 2018-04-09 ENCOUNTER — TELEPHONE (OUTPATIENT)
Dept: CARDIOLOGY CLINIC | Age: 53
End: 2018-04-09

## 2018-04-11 ENCOUNTER — INITIAL CONSULT (OUTPATIENT)
Dept: CARDIOLOGY CLINIC | Age: 53
End: 2018-04-11

## 2018-04-11 VITALS
HEIGHT: 76 IN | OXYGEN SATURATION: 95 % | WEIGHT: 315 LBS | RESPIRATION RATE: 16 BRPM | TEMPERATURE: 98 F | HEART RATE: 75 BPM | SYSTOLIC BLOOD PRESSURE: 112 MMHG | BODY MASS INDEX: 38.36 KG/M2 | DIASTOLIC BLOOD PRESSURE: 88 MMHG

## 2018-04-11 DIAGNOSIS — I48.19 PERSISTENT ATRIAL FIBRILLATION (HCC): Primary | ICD-10-CM

## 2018-04-11 PROCEDURE — 93000 ELECTROCARDIOGRAM COMPLETE: CPT | Performed by: INTERNAL MEDICINE

## 2018-04-11 PROCEDURE — 99214 OFFICE O/P EST MOD 30 MIN: CPT | Performed by: INTERNAL MEDICINE

## 2018-04-11 RX ORDER — ASPIRIN 325 MG
325 TABLET ORAL DAILY
COMMUNITY
End: 2018-04-11 | Stop reason: ALTCHOICE

## 2018-04-11 RX ORDER — AMIODARONE HYDROCHLORIDE 200 MG/1
200 TABLET ORAL DAILY
Qty: 30 TABLET | Refills: 3
Start: 2018-04-11 | End: 2018-08-10

## 2018-04-13 ENCOUNTER — OFFICE VISIT (OUTPATIENT)
Dept: CARDIOLOGY CLINIC | Age: 53
End: 2018-04-13

## 2018-04-13 DIAGNOSIS — I48.0 PAROXYSMAL ATRIAL FIBRILLATION (HCC): Primary | ICD-10-CM

## 2018-04-13 PROCEDURE — 99999 PR OFFICE/OUTPT VISIT,PROCEDURE ONLY: CPT | Performed by: INTERNAL MEDICINE

## 2018-04-17 ENCOUNTER — TELEPHONE (OUTPATIENT)
Dept: CARDIOLOGY CLINIC | Age: 53
End: 2018-04-17

## 2018-04-18 ENCOUNTER — HOSPITAL ENCOUNTER (OUTPATIENT)
Dept: CARDIOLOGY | Age: 53
Discharge: HOME | End: 2018-04-18
Attending: INTERNAL MEDICINE | Admitting: INTERNAL MEDICINE

## 2018-04-18 DIAGNOSIS — I48.0 PAROXYSMAL ATRIAL FIBRILLATION (HCC): ICD-10-CM

## 2018-04-18 RX ORDER — 0.9 % SODIUM CHLORIDE 0.9 %
10 VIAL (ML) INJECTION
Status: COMPLETED | OUTPATIENT
Start: 2018-04-18 | End: 2018-04-18

## 2018-04-18 RX ADMIN — Medication 10 ML: at 09:57

## 2018-04-20 ENCOUNTER — HOSPITAL ENCOUNTER (OUTPATIENT)
Dept: SLEEP CENTER | Age: 53
Discharge: HOME | End: 2018-04-20
Attending: INTERNAL MEDICINE | Admitting: INTERNAL MEDICINE

## 2018-04-20 VITALS
OXYGEN SATURATION: 97 % | HEART RATE: 180 BPM | SYSTOLIC BLOOD PRESSURE: 113 MMHG | WEIGHT: 315 LBS | DIASTOLIC BLOOD PRESSURE: 66 MMHG | HEIGHT: 76 IN | BODY MASS INDEX: 38.36 KG/M2

## 2018-04-20 DIAGNOSIS — G47.10 HYPERSOMNOLENCE: Primary | ICD-10-CM

## 2018-04-20 DIAGNOSIS — R06.83 SNORING: ICD-10-CM

## 2018-04-20 ASSESSMENT — SLEEP AND FATIGUE QUESTIONNAIRES
HOW LIKELY ARE YOU TO NOD OFF OR FALL ASLEEP WHILE WATCHING TV: 2
HOW LIKELY ARE YOU TO NOD OFF OR FALL ASLEEP WHILE SITTING INACTIVE IN A PUBLIC PLACE: 0
HOW LIKELY ARE YOU TO NOD OFF OR FALL ASLEEP WHILE SITTING AND READING: 0
ESS TOTAL SCORE: 6
HOW LIKELY ARE YOU TO NOD OFF OR FALL ASLEEP IN A CAR, WHILE STOPPED FOR A FEW MINUTES IN TRAFFIC: 0
HOW LIKELY ARE YOU TO NOD OFF OR FALL ASLEEP WHEN YOU ARE A PASSENGER IN A CAR FOR AN HOUR WITHOUT A BREAK: 0
HOW LIKELY ARE YOU TO NOD OFF OR FALL ASLEEP WHILE SITTING QUIETLY AFTER LUNCH WITHOUT ALCOHOL: 2
NECK CIRCUMFERENCE (INCHES): 20.5
HOW LIKELY ARE YOU TO NOD OFF OR FALL ASLEEP WHILE LYING DOWN TO REST IN THE AFTERNOON WHEN CIRCUMSTANCES PERMIT: 2
HOW LIKELY ARE YOU TO NOD OFF OR FALL ASLEEP WHILE SITTING AND TALKING TO SOMEONE: 0

## 2018-04-23 ENCOUNTER — HOSPITAL ENCOUNTER (OUTPATIENT)
Dept: GENERAL RADIOLOGY | Age: 53
Discharge: HOME | End: 2018-04-23
Attending: INTERNAL MEDICINE | Admitting: INTERNAL MEDICINE

## 2018-04-23 DIAGNOSIS — Z01.818 PRE-OP EXAMINATION: ICD-10-CM

## 2018-04-23 LAB
ANION GAP SERPL CALCULATED.3IONS-SCNC: 12 MMOL/L (ref 4–16)
APTT: 26.3 SECONDS (ref 21.2–33)
BASOPHILS ABSOLUTE: 0 K/CU MM
BASOPHILS RELATIVE PERCENT: 0.4 % (ref 0–1)
BUN BLDV-MCNC: 18 MG/DL (ref 6–23)
CALCIUM SERPL-MCNC: 9.6 MG/DL (ref 8.3–10.6)
CHLORIDE BLD-SCNC: 105 MMOL/L (ref 99–110)
CO2: 25 MMOL/L (ref 21–32)
CREAT SERPL-MCNC: 1 MG/DL (ref 0.9–1.3)
DIFFERENTIAL TYPE: ABNORMAL
EOSINOPHILS ABSOLUTE: 0.1 K/CU MM
EOSINOPHILS RELATIVE PERCENT: 2.2 % (ref 0–3)
GFR AFRICAN AMERICAN: >60 ML/MIN/1.73M2
GFR NON-AFRICAN AMERICAN: >60 ML/MIN/1.73M2
GLUCOSE BLD-MCNC: 90 MG/DL (ref 70–99)
HCT VFR BLD CALC: 44.5 % (ref 42–52)
HEMOGLOBIN: 14.1 GM/DL (ref 13.5–18)
IMMATURE NEUTROPHIL %: 0.2 % (ref 0–0.43)
INR BLD: 1.17 INDEX
LYMPHOCYTES ABSOLUTE: 1.6 K/CU MM
LYMPHOCYTES RELATIVE PERCENT: 29.7 % (ref 24–44)
MAGNESIUM: 2 MG/DL (ref 1.8–2.4)
MCH RBC QN AUTO: 25.2 PG (ref 27–31)
MCHC RBC AUTO-ENTMCNC: 31.7 % (ref 32–36)
MCV RBC AUTO: 79.5 FL (ref 78–100)
MONOCYTES ABSOLUTE: 0.4 K/CU MM
MONOCYTES RELATIVE PERCENT: 7.7 % (ref 0–4)
NUCLEATED RBC %: 0 %
PDW BLD-RTO: 19 % (ref 11.7–14.9)
PHOSPHORUS: 3.4 MG/DL (ref 2.5–4.9)
PLATELET # BLD: 271 K/CU MM (ref 140–440)
PMV BLD AUTO: 10.3 FL (ref 7.5–11.1)
POTASSIUM SERPL-SCNC: 4.8 MMOL/L (ref 3.5–5.1)
PROTHROMBIN TIME: 13.3 SECONDS (ref 9.12–12.5)
RBC # BLD: 5.6 M/CU MM (ref 4.6–6.2)
SEGMENTED NEUTROPHILS ABSOLUTE COUNT: 3.3 K/CU MM
SEGMENTED NEUTROPHILS RELATIVE PERCENT: 59.8 % (ref 36–66)
SODIUM BLD-SCNC: 142 MMOL/L (ref 135–145)
TOTAL IMMATURE NEUTOROPHIL: 0.01 K/CU MM
TOTAL NUCLEATED RBC: 0 K/CU MM
WBC # BLD: 5.5 K/CU MM (ref 4–10.5)

## 2018-04-25 PROBLEM — Z86.79 S/P ABLATION OF ATRIAL FIBRILLATION: Status: ACTIVE | Noted: 2018-04-25

## 2018-04-25 PROBLEM — Z98.890 S/P ABLATION OF ATRIAL FIBRILLATION: Status: ACTIVE | Noted: 2018-04-25

## 2018-04-25 ASSESSMENT — ENCOUNTER SYMPTOMS
WHEEZING: 0
BLOOD IN STOOL: 0
PHOTOPHOBIA: 0
COLOR CHANGE: 0
SHORTNESS OF BREATH: 1
DIARRHEA: 0
NAUSEA: 0
BACK PAIN: 0
CHEST TIGHTNESS: 0
EYE PAIN: 0
VOMITING: 0
COUGH: 0
ABDOMINAL PAIN: 0
CONSTIPATION: 0

## 2018-05-15 ENCOUNTER — TELEPHONE (OUTPATIENT)
Dept: CARDIOLOGY CLINIC | Age: 53
End: 2018-05-15

## 2018-05-17 RX ORDER — METOPROLOL SUCCINATE 100 MG/1
100 TABLET, EXTENDED RELEASE ORAL DAILY
Qty: 30 TABLET | Refills: 6 | Status: SHIPPED | OUTPATIENT
Start: 2018-05-17 | End: 2018-09-13 | Stop reason: SDUPTHER

## 2018-05-30 ENCOUNTER — OFFICE VISIT (OUTPATIENT)
Dept: CARDIOLOGY CLINIC | Age: 53
End: 2018-05-30

## 2018-05-30 VITALS
BODY MASS INDEX: 38.36 KG/M2 | DIASTOLIC BLOOD PRESSURE: 72 MMHG | SYSTOLIC BLOOD PRESSURE: 118 MMHG | WEIGHT: 315 LBS | HEART RATE: 68 BPM | HEIGHT: 76 IN

## 2018-05-30 DIAGNOSIS — Z98.890 S/P ABLATION OF ATRIAL FIBRILLATION: Primary | ICD-10-CM

## 2018-05-30 DIAGNOSIS — I48.19 PERSISTENT ATRIAL FIBRILLATION (HCC): ICD-10-CM

## 2018-05-30 DIAGNOSIS — Z86.79 S/P ABLATION OF ATRIAL FIBRILLATION: Primary | ICD-10-CM

## 2018-05-30 PROCEDURE — 99212 OFFICE O/P EST SF 10 MIN: CPT | Performed by: INTERNAL MEDICINE

## 2018-05-30 PROCEDURE — 93000 ELECTROCARDIOGRAM COMPLETE: CPT | Performed by: INTERNAL MEDICINE

## 2018-05-31 ENCOUNTER — HOSPITAL ENCOUNTER (OUTPATIENT)
Dept: SLEEP CENTER | Age: 53
Discharge: HOME | End: 2018-05-31
Attending: INTERNAL MEDICINE | Admitting: INTERNAL MEDICINE

## 2018-05-31 DIAGNOSIS — R06.83 SNORING: Primary | ICD-10-CM

## 2018-05-31 DIAGNOSIS — G47.10 HYPERSOMNOLENCE: ICD-10-CM

## 2018-07-18 RX ORDER — AMIODARONE HYDROCHLORIDE 200 MG/1
200 TABLET ORAL DAILY
Qty: 30 TABLET | Refills: 6 | OUTPATIENT
Start: 2018-07-18

## 2018-07-27 ENCOUNTER — OFFICE VISIT (OUTPATIENT)
Dept: CARDIOLOGY CLINIC | Age: 53
End: 2018-07-27

## 2018-07-27 VITALS
WEIGHT: 315 LBS | BODY MASS INDEX: 38.36 KG/M2 | OXYGEN SATURATION: 98 % | HEART RATE: 64 BPM | DIASTOLIC BLOOD PRESSURE: 84 MMHG | SYSTOLIC BLOOD PRESSURE: 118 MMHG | HEIGHT: 76 IN

## 2018-07-27 DIAGNOSIS — I48.19 PERSISTENT ATRIAL FIBRILLATION (HCC): ICD-10-CM

## 2018-07-27 DIAGNOSIS — Z86.79 S/P ABLATION OF ATRIAL FIBRILLATION: Primary | ICD-10-CM

## 2018-07-27 DIAGNOSIS — Z98.890 S/P ABLATION OF ATRIAL FIBRILLATION: Primary | ICD-10-CM

## 2018-07-27 PROCEDURE — 93000 ELECTROCARDIOGRAM COMPLETE: CPT | Performed by: INTERNAL MEDICINE

## 2018-07-27 PROCEDURE — 99212 OFFICE O/P EST SF 10 MIN: CPT | Performed by: INTERNAL MEDICINE

## 2018-07-27 NOTE — PROGRESS NOTES
chest tightness and wheezing. Cardiovascular: DENIES CHEST PAIN AND PALPITAITONS. Negative for leg swelling. Gastrointestinal: Negative for abdominal pain, blood in stool, constipation, diarrhea, nausea and vomiting. Endocrine: Negative for cold intolerance and heat intolerance. Genitourinary: Negative for dysuria, flank pain and hematuria. Musculoskeletal: Positive for arthralgias. Negative for back pain, myalgias and neck stiffness. Skin: Negative for color change and rash. Allergic/Immunologic: Negative for food allergies. Neurological: . Negative for dizziness, numbness and headaches. Hematological: Does not bruise/bleed easily. Psychiatric/Behavioral: Negative for agitation and confusion. Physical Examination:    /84   Pulse 64   Ht 6' 4\" (1.93 m)   Wt (!) 347 lb 9.6 oz (157.7 kg)   SpO2 98%   BMI 42.31 kg/m²    Wt Readings from Last 3 Encounters:   07/27/18 (!) 347 lb 9.6 oz (157.7 kg)   05/30/18 (!) 346 lb (156.9 kg)   04/25/18 (!) 344 lb (156 kg)     Body mass index is 42.31 kg/m². Physical Exam   Constitutional: He is oriented to person, place, and time and well-developed, well-nourished, and in no distress. HENT:   Head: Normocephalic and atraumatic. Eyes: Conjunctivae and EOM are normal. Pupils are equal, round, and reactive to light. Right eye exhibits no discharge. Neck: Normal range of motion. No JVD present. No thyromegaly present. Cardiovascular: Exam reveals no friction rub. Murmur (systolic murmur grade 2/6) heard. Regular rhythm and rate  Pulmonary/Chest: Effort normal. No stridor. No respiratory distress. He has no wheezes. No rales   Abdominal: Soft. Bowel sounds are normal. He exhibits no distension. There is no tenderness. Musculoskeletal: Normal range of motion. He exhibits no edema. Neurological: He is alert and oriented to person, place, and time. No cranial nerve deficit. Skin: Skin is warm and dry. No rash noted.  No erythema. Psychiatric: Mood and affect normal.             CBC:   Lab Results   Component Value Date    WBC 10.7 04/26/2018    HGB 12.7 04/26/2018    HCT 41.6 04/26/2018     04/26/2018     Lipids:   Lab Results   Component Value Date    CHOL 233 01/08/2018    TRIG 118 01/08/2018    HDL 44 01/08/2018    LDLCALC 165 (A) 01/08/2018    LDLDIRECT 169 (H) 01/05/2018     PT/INR:   Lab Results   Component Value Date    INR 1.17 04/23/2018        BMP:    Lab Results   Component Value Date     04/26/2018    K 4.5 04/26/2018     04/26/2018    CO2 22 04/26/2018    BUN 21 04/26/2018     CMP:   Lab Results   Component Value Date    AST 34 01/05/2018    BILITOT 0.7 01/05/2018    ALKPHOS 69 01/05/2018     TSH:    Lab Results   Component Value Date    TSH 1.98 01/05/2018       EKGINTERPRETATION - EKG Interpretation:   Sinus rhythm     IMPRESSION / RECOMMENDATIONS:     1. Persistent atrial fibrillation sp ablation  2. Reduced LVEF   3. HLD  4. Morbid obesity  5 ? Sleep apnea      Feels better  Amiodarone is stopped exercising regularly  Occasional chest twitch he says for a second but otherwise he feels good  No shortness of breath  Continue metoprolol  Continue exercise and weight loss  Follow up with  in 2 months if LVEF is improved then may be decrease metoprolol too    Discussed to avoid alcohol and caffeine  Commended for life style changes  Continue anticoagulation      Thanks again for allowing me to participate in care of this patient. Please call me if you have any questions. With best regards.       Tiff Astudillo MD, 7/27/2018 8:33 AM

## 2018-08-10 ENCOUNTER — HOSPITAL ENCOUNTER (OUTPATIENT)
Dept: SLEEP CENTER | Age: 53
Discharge: HOME | End: 2018-08-10
Attending: INTERNAL MEDICINE | Admitting: INTERNAL MEDICINE

## 2018-08-10 DIAGNOSIS — Z99.89 OSA ON CPAP: ICD-10-CM

## 2018-08-10 DIAGNOSIS — G47.33 OSA ON CPAP: ICD-10-CM

## 2018-08-10 ASSESSMENT — SLEEP AND FATIGUE QUESTIONNAIRES
ESS TOTAL SCORE: 6
HOW LIKELY ARE YOU TO NOD OFF OR FALL ASLEEP IN A CAR, WHILE STOPPED FOR A FEW MINUTES IN TRAFFIC: 0
HOW LIKELY ARE YOU TO NOD OFF OR FALL ASLEEP WHILE WATCHING TV: 0
HOW LIKELY ARE YOU TO NOD OFF OR FALL ASLEEP WHILE SITTING QUIETLY AFTER LUNCH WITHOUT ALCOHOL: 2
HOW LIKELY ARE YOU TO NOD OFF OR FALL ASLEEP WHILE LYING DOWN TO REST IN THE AFTERNOON WHEN CIRCUMSTANCES PERMIT: 3
HOW LIKELY ARE YOU TO NOD OFF OR FALL ASLEEP WHILE SITTING AND READING: 0
HOW LIKELY ARE YOU TO NOD OFF OR FALL ASLEEP WHEN YOU ARE A PASSENGER IN A CAR FOR AN HOUR WITHOUT A BREAK: 0
HOW LIKELY ARE YOU TO NOD OFF OR FALL ASLEEP WHILE SITTING INACTIVE IN A PUBLIC PLACE: 0
HOW LIKELY ARE YOU TO NOD OFF OR FALL ASLEEP WHILE SITTING AND TALKING TO SOMEONE: 1

## 2018-08-10 NOTE — PROGRESS NOTES
Progress Note    Subjective: The patient is using Cpap,sleeping well. Fresh in am.No EDS. No snoring. Download reviewed. Shortness of breath none  Chest pain none  Addressing respiratory complaints Patient is negative for  hemoptysis and cyanosis  CONSTITUTIONAL:  negative for fevers and chills      Past Medical History:     has a past medical history of Atrial fibrillation ([address] Breast mass; Dyspnea; History of stress test; Hypercholesterolemia; Melena; and Snoring. PAST SURGICAL HISTORY:   has a past surgical history that includes Carpal tunnel release (Bilateral, 1983); Lithotripsy; Elbow surgery; back surgery; and Atrial ablation surgery (N/A, 04/25/2018). SOCIAL HISTORY:   reports that he has never smoked. He has never used smokeless tobacco. He reports that he does not drink alcohol or use drugs. Family history:  family history includes Asthma in his maternal grandmother; Diabetes in his father; Hypotension in his father. Objective:   PHYSICAL EXAM:        VITALS:  There were no vitals taken for this visit. 24HR INTAKE/OUTPUT:  No intake or output data in the 24 hours ending 08/10/18 1709    CONSTITUTIONAL:  awake, alert, cooperative, no apparent distress, and appears stated age  LUNGS:  decreased breath sounds  CARDIOVASCULAR:  normal S1 and S2 and negative JVD  ABD:Abdomen soft, non-tender. BS normal. No masses,  No organomegaly  NEURO:Alert and oriented x3. Gait normal. Reflexes and motor strength normal and symmetric. Cranial nerves 2-12 and sensation grossly intact. DATA:    CBC:  No results for input(s): WBC, RBC, HGB, HCT, PLT, MCV, MCH, MCHC, RDW, NRBC, SEGSPCT, BANDSPCT in the last 72 hours. BMP:  No results for input(s): NA, K, CL, CO2, BUN, CREATININE, CALCIUM, GLUCOSE in the last 72 hours. ABG:  No results for input(s): PH, PO2ART, SUB0QHN, HCO3, BEART, O2SAT in the last 72 hours.   No results found for: PROBNP, [facility]  Radiology Review:  Pertinent images / reports were reviewed as a part of this visit. Assessment:     Patient Active Problem List   Diagnosis    Persistent atrial fibrillation (HCC)    Orthostatic dizziness    Precordial pain    Anemia due to GI blood loss    Paroxysmal atrial fibrillation (HCC)    Cardiomyopathy (HCC)    Snoring    Hypersomnolence    S/P ablation of atrial fibrillation    DARYL on CPAP       Plan:   1. Overall the patient has improved. 2. RTO 3 mths.       Eusebio Liao MD  8/10/2018  5:09 PM

## 2018-09-11 ENCOUNTER — TELEPHONE (OUTPATIENT)
Dept: CARDIOLOGY CLINIC | Age: 53
End: 2018-09-11

## 2018-09-11 NOTE — TELEPHONE ENCOUNTER
LM to r/s phyllis appt   not in the office this week.      appts open in [place] 10/4  Or later in the month in [place]

## 2018-09-13 ENCOUNTER — OFFICE VISIT (OUTPATIENT)
Dept: CARDIOLOGY CLINIC | Age: 53
End: 2018-09-13

## 2018-09-13 VITALS
HEIGHT: 76 IN | HEART RATE: 65 BPM | WEIGHT: 315 LBS | BODY MASS INDEX: 38.36 KG/M2 | SYSTOLIC BLOOD PRESSURE: 118 MMHG | DIASTOLIC BLOOD PRESSURE: 88 MMHG

## 2018-09-13 DIAGNOSIS — Z86.79 S/P ABLATION OF ATRIAL FIBRILLATION: ICD-10-CM

## 2018-09-13 DIAGNOSIS — Z98.890 S/P ABLATION OF ATRIAL FIBRILLATION: ICD-10-CM

## 2018-09-13 DIAGNOSIS — R07.2 PRECORDIAL PAIN: ICD-10-CM

## 2018-09-13 DIAGNOSIS — I42.0 DILATED CARDIOMYOPATHY (HCC): Primary | ICD-10-CM

## 2018-09-13 DIAGNOSIS — I48.19 PERSISTENT ATRIAL FIBRILLATION (HCC): ICD-10-CM

## 2018-09-13 DIAGNOSIS — I48.0 PAROXYSMAL ATRIAL FIBRILLATION (HCC): ICD-10-CM

## 2018-09-13 PROCEDURE — 99214 OFFICE O/P EST MOD 30 MIN: CPT | Performed by: INTERNAL MEDICINE

## 2018-09-13 PROCEDURE — 93000 ELECTROCARDIOGRAM COMPLETE: CPT | Performed by: INTERNAL MEDICINE

## 2018-09-13 RX ORDER — METOPROLOL SUCCINATE 100 MG/1
100 TABLET, EXTENDED RELEASE ORAL DAILY
Qty: 30 TABLET | Refills: 6 | Status: SHIPPED | OUTPATIENT
Start: 2018-09-13 | End: 2019-06-24 | Stop reason: SDUPTHER

## 2018-09-13 RX ORDER — METOPROLOL SUCCINATE 100 MG/1
100 TABLET, EXTENDED RELEASE ORAL DAILY
Qty: 30 TABLET | Refills: 6 | Status: CANCELLED | OUTPATIENT
Start: 2018-09-13

## 2018-09-13 NOTE — PROGRESS NOTES
CARDIOLOGY  NOTE    Chief Complaint: CHF    HPI:   Drea Waite is a 50y.o. year old who has history as noted below. HE is s/p Afib ablation . He is feeling much better . But he has no energy . HE feels he was better last year   Cath showed no significant cad . 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 He is able to continue doing very hard work. Father is sick which makes him very sad and emotional.        Current Outpatient Prescriptions   Medication Sig Dispense Refill    metoprolol succinate (TOPROL XL) 100 MG extended release tablet Take 1 tablet by mouth daily 30 tablet 6    apixaban (ELIQUIS) 5 MG TABS tablet Take 1 tablet by mouth 2 times daily 60 tablet 6    sacubitril-valsartan (ENTRESTO) 49-51 MG per tablet Take 1 tablet by mouth 2 times daily 60 tablet 6    CPAP Machine MISC by Does not apply route      aspirin 81 MG tablet Take 81 mg by mouth daily      nitroGLYCERIN (NITROSTAT) 0.4 MG SL tablet Place 1 tablet under the tongue every 5 minutes as needed for Chest pain 25 tablet 3     No current facility-administered medications for this visit.         Allergies:   Penicillins    Patient History:  Past Medical History:   Diagnosis Date    Atrial fibrillation ([address]     Breast mass     Dyspnea     History of stress test 01/08/2018    EF 32% with global hypokinesis pt was in afib during exam. needs invasive work up for further discussion    Hypercholesterolemia     Melena     Snoring      Past Surgical History:   Procedure Laterality Date    ATRIAL ABLATION SURGERY N/A 04/25/2018    AFIB    BACK SURGERY      3- discectomy    CARPAL TUNNEL RELEASE Bilateral 1983    Carpal tunnel bilatera    ELBOW SURGERY      bicep muscle tendon repair    LITHOTRIPSY       Family History   Problem Relation Age of Onset    Hypotension Father     Diabetes Father     Asthma Maternal Grandmother      Social afib      Angiographic Findings    Diagnostic Findings    Cardiac Arteries and Lesion Findings     LMCA: Normal and No Angiographicalyl Significant Disease. LAD: Normal (no stenosis %) and No Angiographicalyl Significant Disease. Type  III vessel , 2 large diagonal are widely patent  LCx: widely patent  RCA: Normal (no stenosis %) and No Angiographicalyl Significant Disease. gives  PDA , it is widely patent , right dominant vessel, large calibre vessel      All labs, medications and tests reviewed by myself including data and history from outside source , patient and available family . Assessment & Plan:      1. Dilated cardiomyopathy ([address]    2. Persistent atrial fibrillation ([address]    3. S/P ablation of atrial fibrillation    4. Precordial pain    5. Paroxysmal atrial fibrillation (HCC)       Non Ischemic cardiomyopathy  No significant CAD   Non ischemic . Cardiomyopathy could be A. Fib related   continueToprol-XL  . Continue  Entresto get limited echo before he sees EPS to decide about ICD/ BIV, He does not need to be on lasix any more . HE is tired anf fatigued with low energy if EF has improved may try to lower toprol xl dose    Persistent atrial fibrillation [facility])   He has failed cardioversion twice  ( jan 2018 and march 2018). He the underwent afib ablation  In April 2018 , He is seeing EPS   He not feeling lightheaded or  Dizzy any more . He denies palpitations. .  His chads score is 1 but continue elequis due to post cardioversion was on Jan 24th  , if Ef is improved and normal stop eliquis     Precordial pain  Cath shows normal cath     Orthostatic dizziness  He reports feeling dizzy when he stands up, improved now      Dyslipidemia :  Keya Patel had lab work recently,  Lipid profile was reviewed with patient. Counseled extensively and medication compliance urged. We discussed that for the  prevention of ASCVD our  goal is aggressive risk modification. Patient is encouraged to exercise even a brisk walk for 30

## 2018-11-14 ENCOUNTER — TELEPHONE (OUTPATIENT)
Dept: CARDIOLOGY CLINIC | Age: 53
End: 2018-11-14

## 2018-11-28 ENCOUNTER — PROCEDURE VISIT (OUTPATIENT)
Dept: CARDIOLOGY CLINIC | Age: 53
End: 2018-11-28
Payer: COMMERCIAL

## 2018-11-28 DIAGNOSIS — I48.0 PAROXYSMAL ATRIAL FIBRILLATION (HCC): ICD-10-CM

## 2018-11-28 DIAGNOSIS — R07.2 PRECORDIAL PAIN: ICD-10-CM

## 2018-11-28 DIAGNOSIS — Z86.79 S/P ABLATION OF ATRIAL FIBRILLATION: ICD-10-CM

## 2018-11-28 DIAGNOSIS — I48.19 PERSISTENT ATRIAL FIBRILLATION (HCC): ICD-10-CM

## 2018-11-28 DIAGNOSIS — Z98.890 S/P ABLATION OF ATRIAL FIBRILLATION: ICD-10-CM

## 2018-11-28 DIAGNOSIS — I42.0 DILATED CARDIOMYOPATHY (HCC): Primary | ICD-10-CM

## 2018-11-28 PROCEDURE — 93308 TTE F-UP OR LMTD: CPT | Performed by: INTERNAL MEDICINE

## 2018-11-29 ENCOUNTER — PATIENT MESSAGE (OUTPATIENT)
Dept: CARDIOLOGY CLINIC | Age: 53
End: 2018-11-29

## 2018-11-29 ENCOUNTER — TELEPHONE (OUTPATIENT)
Dept: CARDIOLOGY CLINIC | Age: 53
End: 2018-11-29

## 2018-11-29 NOTE — TELEPHONE ENCOUNTER
LM on VM of normal echo results. This is a limited echo to assess ejection fraction and regional wall motion.   Left ventricular systolic function is normal. Ejection fraction of 55-60%.   EF has improved compared to previous echo in Jan 2018   Normal pulmonary artery pressure.

## 2019-03-14 ENCOUNTER — OFFICE VISIT (OUTPATIENT)
Dept: CARDIOLOGY CLINIC | Age: 54
End: 2019-03-14
Payer: COMMERCIAL

## 2019-03-14 VITALS
WEIGHT: 315 LBS | BODY MASS INDEX: 38.36 KG/M2 | SYSTOLIC BLOOD PRESSURE: 126 MMHG | RESPIRATION RATE: 18 BRPM | DIASTOLIC BLOOD PRESSURE: 84 MMHG | HEART RATE: 80 BPM | HEIGHT: 76 IN

## 2019-03-14 DIAGNOSIS — R42 ORTHOSTATIC DIZZINESS: ICD-10-CM

## 2019-03-14 DIAGNOSIS — I42.0 DILATED CARDIOMYOPATHY (HCC): ICD-10-CM

## 2019-03-14 DIAGNOSIS — I48.19 PERSISTENT ATRIAL FIBRILLATION (HCC): ICD-10-CM

## 2019-03-14 DIAGNOSIS — I48.0 PAROXYSMAL ATRIAL FIBRILLATION (HCC): Primary | ICD-10-CM

## 2019-03-14 DIAGNOSIS — Z99.89 OSA ON CPAP: ICD-10-CM

## 2019-03-14 DIAGNOSIS — Z98.890 S/P ABLATION OF ATRIAL FIBRILLATION: ICD-10-CM

## 2019-03-14 DIAGNOSIS — Z86.79 S/P ABLATION OF ATRIAL FIBRILLATION: ICD-10-CM

## 2019-03-14 DIAGNOSIS — G47.33 OSA ON CPAP: ICD-10-CM

## 2019-03-14 DIAGNOSIS — I73.9 CLAUDICATION (HCC): ICD-10-CM

## 2019-03-14 PROCEDURE — 99214 OFFICE O/P EST MOD 30 MIN: CPT | Performed by: INTERNAL MEDICINE

## 2019-04-08 ENCOUNTER — HOSPITAL ENCOUNTER (OUTPATIENT)
Dept: ULTRASOUND IMAGING | Age: 54
Discharge: HOME | End: 2019-04-08
Payer: COMMERCIAL

## 2019-04-08 DIAGNOSIS — Z98.890 S/P ABLATION OF ATRIAL FIBRILLATION: ICD-10-CM

## 2019-04-08 DIAGNOSIS — I48.0 PAROXYSMAL ATRIAL FIBRILLATION (HCC): ICD-10-CM

## 2019-04-08 DIAGNOSIS — I42.0 DILATED CARDIOMYOPATHY (HCC): ICD-10-CM

## 2019-04-08 DIAGNOSIS — Z86.79 S/P ABLATION OF ATRIAL FIBRILLATION: ICD-10-CM

## 2019-04-08 DIAGNOSIS — Z99.89 OSA ON CPAP: ICD-10-CM

## 2019-04-08 DIAGNOSIS — R42 ORTHOSTATIC DIZZINESS: ICD-10-CM

## 2019-04-08 DIAGNOSIS — I48.19 PERSISTENT ATRIAL FIBRILLATION (HCC): ICD-10-CM

## 2019-04-08 DIAGNOSIS — I73.9 CLAUDICATION (HCC): ICD-10-CM

## 2019-04-08 DIAGNOSIS — G47.33 OSA ON CPAP: ICD-10-CM

## 2019-04-08 PROCEDURE — 93925 LOWER EXTREMITY STUDY: CPT

## 2019-06-24 RX ORDER — METOPROLOL SUCCINATE 100 MG/1
100 TABLET, EXTENDED RELEASE ORAL DAILY
Qty: 30 TABLET | Refills: 6 | Status: SHIPPED | OUTPATIENT
Start: 2019-06-24 | End: 2019-09-26 | Stop reason: SDUPTHER

## 2019-09-26 ENCOUNTER — OFFICE VISIT (OUTPATIENT)
Dept: CARDIOLOGY CLINIC | Age: 54
End: 2019-09-26
Payer: COMMERCIAL

## 2019-09-26 VITALS
HEART RATE: 88 BPM | DIASTOLIC BLOOD PRESSURE: 84 MMHG | RESPIRATION RATE: 18 BRPM | SYSTOLIC BLOOD PRESSURE: 134 MMHG | HEIGHT: 76 IN | WEIGHT: 315 LBS | BODY MASS INDEX: 38.36 KG/M2

## 2019-09-26 DIAGNOSIS — I73.9 CLAUDICATION (HCC): ICD-10-CM

## 2019-09-26 DIAGNOSIS — R42 ORTHOSTATIC DIZZINESS: ICD-10-CM

## 2019-09-26 DIAGNOSIS — Z98.890 S/P ABLATION OF ATRIAL FIBRILLATION: ICD-10-CM

## 2019-09-26 DIAGNOSIS — Z86.79 S/P ABLATION OF ATRIAL FIBRILLATION: ICD-10-CM

## 2019-09-26 DIAGNOSIS — I48.19 PERSISTENT ATRIAL FIBRILLATION (HCC): ICD-10-CM

## 2019-09-26 DIAGNOSIS — I42.0 DILATED CARDIOMYOPATHY (HCC): Primary | ICD-10-CM

## 2019-09-26 DIAGNOSIS — I48.0 PAROXYSMAL ATRIAL FIBRILLATION (HCC): ICD-10-CM

## 2019-09-26 PROCEDURE — 99214 OFFICE O/P EST MOD 30 MIN: CPT | Performed by: INTERNAL MEDICINE

## 2019-09-26 PROCEDURE — 93000 ELECTROCARDIOGRAM COMPLETE: CPT | Performed by: INTERNAL MEDICINE

## 2019-09-26 RX ORDER — METOPROLOL SUCCINATE 50 MG/1
100 TABLET, EXTENDED RELEASE ORAL DAILY
Qty: 60 TABLET | Refills: 2
Start: 2019-09-26 | End: 2020-07-09 | Stop reason: ALTCHOICE

## 2019-09-26 RX ORDER — METOPROLOL SUCCINATE 50 MG/1
100 TABLET, EXTENDED RELEASE ORAL DAILY
Qty: 30 TABLET | Refills: 2 | Status: SHIPPED | OUTPATIENT
Start: 2019-09-26 | End: 2019-09-26 | Stop reason: SDUPTHER

## 2019-09-26 NOTE — PROGRESS NOTES
range of motion, No tenderness, Supple, No stridor,no apical-carotid delay  Eyes:  PERRL, EOMI, Conjunctiva normal, No discharge. Respiratory:  Normal breath sounds, No respiratory distress, No wheezing, No chest tenderness. ,no use of accessory muscles,   Cardiovascular: (PMI) apex non displaced,no lifts no thrills,S1 and S2 audible, No added heart sounds, No signs of ankle edema, or volume overload, No evidence of JVD  GI:  Bowel sounds normal, Soft, No tenderness, No masses, No gross visceromegaly   :  No costovertebral angle tenderness   Musculoskeletal:  No edema, no tenderness, no deformities. Back- no tenderness  Integument:  Well hydrated, no rash   Lymphatic:  No lymphadenopathy noted   Neurologic:  Alert & oriented x 3, CN 2-12 normal, normal motor function, normal sensory function, no focal deficits noted   Psychiatric:  Speech and behavior appropriate       Medical decision making and Data review:  DATA:  No results found for: TROPONINT  BNP:  No results found for: PROBNP  PT/INR:  No results found for: PTINR  No results found for: LABA1C  Lab Results   Component Value Date    CHOL 233 01/08/2018    TRIG 118 01/08/2018    HDL 44 01/08/2018    LDLCALC 165 (A) 01/08/2018    LDLDIRECT 169 (H) 01/05/2018     Lab Results   Component Value Date    ALT 46 01/05/2018    AST 34 01/05/2018     TSH:   Lab Results   Component Value Date    TSH 1.98 01/05/2018     Echo 1/8/18   Summary   Atrial fibrillation.   Left ventricular systolic function is mildly depressed with an ejection   fraction of 25 to 30 %   Mild concentric left ventricular hypertrophy.   Left Ventricular size is dilated.   Moderate bilateral atrial dilatation.   Doppler evaluation reveals mild aortic, mitral, tricuspid regurgitation.   No evidence of pericardial effusion.    stress test 1/8/18  Summary    A. Fib may affect EF results adversely    Supervising physician Dr. Oscar Munroe .    non specific T wave inversion in lat leads but negative for ischemia by    diagnostic criteria    Abnormal reduced EF 32 % with global hypokinesis, patient was in afib during    exam    Moderate intensity medium size lat wall reversible defect in stress images    Fixed inferior defect consisted with diaphragmatic artifact    dilated left ventricle   Cath 3/14/18  Procedure Summary   Radial Access, Normal coronaries. LVEDp was 18 mmHG   No significant CAD, Right dominant system   Pt noted to be in afib      Angiographic Findings    Diagnostic Findings    Cardiac Arteries and Lesion Findings     LMCA: Normal and No Angiographicalyl Significant Disease. LAD: Normal (no stenosis %) and No Angiographicalyl Significant Disease. Type  III vessel , 2 large diagonal are widely patent  LCx: widely patent  RCA: Normal (no stenosis %) and No Angiographicalyl Significant Disease. gives  PDA , it is widely patent , right dominant vessel, large calibre vessel    Echo 11/28/18  Summary   This is a limited echo to assess ejection fraction and regional wall motion.   Left ventricular systolic function is normal. Ejection fraction of 55-60%.   EF has improved compared to previous echo in Jan 2018   Normal pulmonary artery pressure.       All labs, medications and tests reviewed by myself including data and history from outside source , patient and available family . Assessment & Plan:      1. Dilated cardiomyopathy ([address]    2. Claudication ([address]    3. Orthostatic dizziness    4. Paroxysmal atrial fibrillation (HCC)    5. Persistent atrial fibrillation ([address]    6. S/P ablation of atrial fibrillation       Non Ischemic cardiomyopathy  No significant CAD   Non ischemic . Cardiomyopathy could be A. Fib related   to restart Toprol-XL lower dose. since he is depressed pression , since Ef is better we can try and wean off entresto , repeat echo to make sure EF is not declining since he stopped all his meds    Persistent atrial fibrillation [facility])  He has failed cardioversion twice  ( jan 2018 and march

## 2019-10-24 ENCOUNTER — TELEPHONE (OUTPATIENT)
Dept: CARDIOLOGY CLINIC | Age: 54
End: 2019-10-24

## 2019-10-29 ENCOUNTER — HOSPITAL ENCOUNTER (OUTPATIENT)
Dept: CARDIAC REHAB | Age: 54
Discharge: HOME | End: 2019-10-29
Payer: COMMERCIAL

## 2019-10-29 LAB
LV EF: 58 %
LVEF MODALITY: NORMAL

## 2019-10-29 PROCEDURE — 93306 TTE W/DOPPLER COMPLETE: CPT

## 2019-10-31 NOTE — TELEPHONE ENCOUNTER
Dr. Dominqiue Ingram is supervising clinician today.    Levon presents to the clinic [facility] for 2 unit(s) of PRBCs    1 - No physically strenuous activity, but ambulatory and able to carry out light or sedentary work.    Nursing Assessment: A focused nursing assessment was performed and the patient reports the following: Nausea: NO  Vomiting: NO  Fever: NO  Chills: NO  Other signs of infection: NO  Bleeding: YES, dark red stools, normal for patient  Diarrhea: NO  Constipation: NO  Cough: NO  Shortness of Breath: YES, with activity  Fatigue/Weakness: YES, better with rest  Edema: NO  Pain: YES, Pain Ratin, Location: generalized/back, Intervention: chronic, pain medication administered per MD order        Pre-Treatment:  Visit Vitals  /58   Pulse 86   Temp 98.7 °F (37.1 °C) (Oral)   Resp 18   SpO2 100%     Patient type and crossed on 19  Consent obtained by provider.  Premedications: Yes     Labs:  WBC (K/mcL)   Date Value   2019 17.8 (H)     RBC (mil/mcL)   Date Value   2019 2.67 (L)     HCT (%)   Date Value   2019 18.5 (L)     HGB (g/dL)   Date Value   2019 5.4 (LL)     PLT (K/mcL)   Date Value   2019 242   2013 263        Treatment:  Refer to LDA and MAR for line assessment and medication administration  Refer to Blood Administration flowsheet for documentation  Patient is identified by first & last name, date of birth, MRN, with the patient at the chair side.      Post Treatment: Treatment tolerated well; no adverse reaction  ?  Education:  Barriers to Learning: None  Teaching Method: Blood Transfusion FYWB  Reviewed possible transfusion reaction including: type of reaction, symptoms and management.  Learner Outcomes:  Patient and/or caregiver verbalize understanding of information given    Next appointment scheduled:   Future Appointments   Date Time Provider Department Center   2019  8:30 AM SDT VL LAB [facility] SDT   2019  9:00 AM SDT VL TREATMENT  Notified of results.         Summary   Mild concentric LVH with normal systolic function.  EF is 55-60 % .   Mild bilateral atrial and right ventricular dilatation.   Trivial aortic regurgitation is noted.   No evidence of pericardial effusion.    CHAIR [facility] SDT   1/11/2019  9:15 AM SDT [facility] LAB [facility] SDT   1/11/2019  9:30 AM SDT VL TREATMENT CHAIR [facility] SDT   1/14/2019  9:00 AM SDT VL LAB [facility] SDT   1/14/2019  9:15 AM SDT VL TREATMENT CHAIR [facility] SDT   1/18/2019  8:45 AM SDT [facility] LAB [facility] SDT   1/18/2019  9:00 AM [facility] SDT       Patient instructed to call the office with any questions or concerns.     Central Line Type: Port  Central Line Site: Right  and Chest  Port cleansed with ChloraPrep per protocol.  Accessed via: 20 gauge Nuñez needle  Patency Verification: Blood return greater or equal to 3 ml before, during and after treatment  Port flushed with: 20 ml 0.9 normal saline and 100 un/ml- 500 units heparin  Nuñez needle removed: Yes  Deaccess/site appearance: Clear (no redness, tenderness, or edema), dressing applied if required  Discharged: Stable  Comment: WNL    Patient Discharged: Pt discharged to home per self, ambulatory.

## 2020-07-09 ENCOUNTER — OFFICE VISIT (OUTPATIENT)
Dept: CARDIOLOGY CLINIC | Age: 55
End: 2020-07-09
Payer: COMMERCIAL

## 2020-07-09 VITALS
HEIGHT: 76 IN | WEIGHT: 308 LBS | SYSTOLIC BLOOD PRESSURE: 142 MMHG | RESPIRATION RATE: 16 BRPM | HEART RATE: 81 BPM | DIASTOLIC BLOOD PRESSURE: 102 MMHG | TEMPERATURE: 97.3 F | BODY MASS INDEX: 37.51 KG/M2

## 2020-07-09 PROBLEM — E78.5 DYSLIPIDEMIA: Status: ACTIVE | Noted: 2020-07-09

## 2020-07-09 PROCEDURE — 93000 ELECTROCARDIOGRAM COMPLETE: CPT | Performed by: INTERNAL MEDICINE

## 2020-07-09 PROCEDURE — 99214 OFFICE O/P EST MOD 30 MIN: CPT | Performed by: INTERNAL MEDICINE

## 2020-07-09 RX ORDER — CARVEDILOL 3.12 MG/1
3.12 TABLET ORAL DAILY
Qty: 90 TABLET | Refills: 1 | Status: SHIPPED | OUTPATIENT
Start: 2020-07-09 | End: 2021-06-10 | Stop reason: SDUPTHER

## 2020-07-09 NOTE — PROGRESS NOTES
[id number] Score for Atrial Fibrillation Stroke Risk   Risk   Factors  Component Value   C CHF No 0   H HTN No 0   A2 Age >= 76 No,  (50 y.o.) 0   D DM No 0   S2 Prior Stroke/TIA No 0   V Vascular Disease No 0   A Age 74-69 No,  (50 y.o.) 0   Sc Sex male 0    [id number]  Score  0   Score last updated 7/9/20 8:97 AM EDT    Click here for a link to the UpToDate guideline \"Atrial Fibrillation: Anticoagulation therapy to prevent embolization    Disclaimer: Risk Score calculation is dependent on accuracy of patient problem list and past encounter diagnosis.

## 2020-07-09 NOTE — PROGRESS NOTES
CARDIOLOGY  NOTE    Chief Complaint: CHF    HPI:   Korina Panchal is a 48y.o. year old he says that he has been under a lot of anxiety that her episodes of him just not wanting to do much he is showing me pictures of extensive stone work that is done around his house. Blood pressure is high today but he thinks is because of back pain. .he states that he stopped taking all his medication few months ago he denies any palpitations his legs still hurt although close arterial Doppler were normal.  He says after stopping medication his legs stopped hurting. He has no shortness of breath but his main complaint is that he has no energy and would like to have more strength he feels like he does not have the same strength anymore. He is very sad and depressed , parents read the will and he did not get any inheritance. HE feels his \"heart has broken\" . HE is s/p Afib ablation . He is feeling much better . last echo in Nov 2018 showed improvement of his EF to normal range   Cath showed no significant cad . He has non ischemic cardiomyopathy  He works as a [employer] and he is doing much better compared to last year . He is able to continue doing very hard work. Father is sick which makes him very sad and emotional.  He is currently home not doing much work due to covid crisis        Current Outpatient Medications   Medication Sig Dispense Refill    carvedilol (COREG) 3.125 MG tablet Take 1 tablet by mouth daily 90 tablet 1    CPAP Machine MISC by Does not apply route       No current facility-administered medications for this visit. Allergies:   Penicillins    Patient History:  Past Medical History:   Diagnosis Date    Atrial fibrillation ([address]     Breast mass     Dyspnea     H/O echocardiogram 10/29/2019    EF 55-60%, Mild bilateral atrial and right ventricular dilatation.     History of echocardiogram 11/28/2018    Limited, EF 55-60%    History of stress test 01/08/2018    EF 32% with global hypokinesis pt was in afib during exam. needs invasive work up for further discussion    Hypercholesterolemia     Melena     Snoring      Past Surgical History:   Procedure Laterality Date    ATRIAL ABLATION SURGERY N/A 04/25/2018    AFIB    BACK SURGERY      3- discectomy    CARPAL TUNNEL RELEASE Bilateral 1983    Carpal tunnel bilatera    ELBOW SURGERY      bicep muscle tendon repair    LITHOTRIPSY       Family History   Problem Relation Age of Onset    Hypotension Father     Diabetes Father     Asthma Maternal Grandmother      Social History     Tobacco Use    Smoking status: Never Smoker    Smokeless tobacco: Never Used   Substance Use Topics    Alcohol use: No        Review of Systems:   · Constitutional: No Fever or Weight Loss   · Eyes: No Decreased Vision  · ENT: No Headaches, Hearing Loss or Vertigo  · Cardiovascular: as per note above   · Respiratory: No cough or wheezing and as per note above. · Gastrointestinal: No abdominal pain, appetite loss, blood in stools, constipation, diarrhea or heartburn  · Genitourinary: No dysuria, trouble voiding, or hematuria  · Musculoskeletal:  None  · Integumentary: No rash or pruritis  · Neurological: No TIA or stroke symptoms  · Psychiatric: No anxiety or depression  · Endocrine: No malaise, fatigue or temperature intolerance  · Hematologic/Lymphatic: No bleeding problems, blood clots or swollen lymph nodes  · Allergic/Immunologic: No nasal congestion or hives    Objective:      Physical Exam:  BP (!) 142/102   Pulse 81   Temp 97.3 °F (36.3 °C)   Resp 16   Ht 6' 4\" (1.93 m)   Wt (!) 308 lb (139.7 kg)   BMI 37.49 kg/m²   Wt Readings from Last 3 Encounters:   07/09/20 (!) 308 lb (139.7 kg)   09/26/19 (!) 353 lb (160.1 kg)   03/14/19 (!) 351 lb (159.2 kg)     Body mass index is 37.49 kg/m².   Vitals:    07/09/20 0905   BP: (!) 142/102   Pulse:    Resp:    Temp:         General Appearance:  No distress, conversant  Constitutional:  Well developed, Well nourished, No acute distress, Non-toxic appearance. HENT:  Normocephalic, Atraumatic, Bilateral external ears normal, Oropharynx moist, No oral exudates, Nose normal. Neck- Normal range of motion, No tenderness, Supple, No stridor,no apical-carotid delay  Eyes:  PERRL, EOMI, Conjunctiva normal, No discharge. Respiratory:  Normal breath sounds, No respiratory distress, No wheezing, No chest tenderness. ,no use of accessory muscles,   Cardiovascular: (PMI) apex non displaced,no lifts no thrills,S1 and S2 audible, No added heart sounds, No signs of ankle edema, or volume overload, No evidence of JVD  GI:  Bowel sounds normal, Soft, No tenderness, No masses, No gross visceromegaly   :  No costovertebral angle tenderness   Musculoskeletal:  No edema, no tenderness, no deformities.  Back- no tenderness  Integument:  Well hydrated, no rash   Lymphatic:  No lymphadenopathy noted   Neurologic:  Alert & oriented x 3, CN 2-12 normal, normal motor function, normal sensory function, no focal deficits noted   Psychiatric:  Speech and behavior appropriate       Medical decision making and Data review:  DATA:  No results found for: TROPONINT  BNP:  No results found for: PROBNP  PT/INR:  No results found for: PTINR  No results found for: LABA1C  Lab Results   Component Value Date    CHOL 233 01/08/2018    TRIG 118 01/08/2018    HDL 44 01/08/2018    LDLCALC 165 (A) 01/08/2018    LDLDIRECT 169 (H) 01/05/2018     Lab Results   Component Value Date    ALT 46 01/05/2018    AST 34 01/05/2018     TSH:   Lab Results   Component Value Date    TSH 1.98 01/05/2018     Echo 1/8/18   Summary   Atrial fibrillation.   Left ventricular systolic function is mildly depressed with an ejection   fraction of 25 to 30 %   Mild concentric left ventricular hypertrophy.   Left Ventricular size is dilated.   Moderate bilateral atrial dilatation.   Doppler evaluation reveals mild aortic, mitral, tricuspid regurgitation.   No evidence of pericardial effusion.    stress test 1/8/18  Summary    A. Fib may affect EF results adversely    Supervising physician Dr. Lewis Rodriguez .   Renuka Hilario specific T wave inversion in lat leads but negative for ischemia by    diagnostic criteria    Abnormal reduced EF 32 % with global hypokinesis, patient was in afib during    exam    Moderate intensity medium size lat wall reversible defect in stress images    Fixed inferior defect consisted with diaphragmatic artifact    dilated left ventricle   Cath 3/14/18  Procedure Summary   Radial Access, Normal coronaries. LVEDp was 18 mmHG   No significant CAD, Right dominant system   Pt noted to be in afib      Angiographic Findings    Diagnostic Findings    Cardiac Arteries and Lesion Findings     LMCA: Normal and No Angiographicalyl Significant Disease. LAD: Normal (no stenosis %) and No Angiographicalyl Significant Disease. Type  III vessel , 2 large diagonal are widely patent  LCx: widely patent  RCA: Normal (no stenosis %) and No Angiographicalyl Significant Disease. gives  PDA , it is widely patent , right dominant vessel, large calibre vessel    Echo 11/28/18  Summary   This is a limited echo to assess ejection fraction and regional wall motion.   Left ventricular systolic function is normal. Ejection fraction of 55-60%.   EF has improved compared to previous echo in Jan 2018   Normal pulmonary artery pressure.       All labs, medications and tests reviewed by myself including data and history from outside source , patient and available family . Assessment & Plan:      1. Dilated cardiomyopathy ([address]    2. Claudication ([address]    3. Orthostatic dizziness    4. Paroxysmal atrial fibrillation (HCC)    5. Persistent atrial fibrillation    6. S/P ablation of atrial fibrillation    7. Dyslipidemia       Non Ischemic cardiomyopathy  Probably had tachycardia induced cardiomyopathy in setting of uncontrolled A. fib no significant CAD   Non ischemic . Cardiomyopathy could be A. Fib related  .   I (around 8/9/2020). Please note this report has been partially produced using speech recognition software and may contain errors related to that system including errors in grammar, punctuation, and spelling, as well as words and phrases that may be inappropriate.  If there are any questions or concerns please feel free to contact the dictating provider for clarification.

## 2021-06-10 ENCOUNTER — OFFICE VISIT (OUTPATIENT)
Dept: CARDIOLOGY CLINIC | Age: 56
End: 2021-06-10
Payer: COMMERCIAL

## 2021-06-10 VITALS
RESPIRATION RATE: 16 BRPM | WEIGHT: 315 LBS | HEIGHT: 76 IN | SYSTOLIC BLOOD PRESSURE: 150 MMHG | BODY MASS INDEX: 38.36 KG/M2 | OXYGEN SATURATION: 97 % | HEART RATE: 74 BPM | DIASTOLIC BLOOD PRESSURE: 86 MMHG

## 2021-06-10 DIAGNOSIS — R07.2 PRECORDIAL PAIN: ICD-10-CM

## 2021-06-10 DIAGNOSIS — Z86.79 S/P ABLATION OF ATRIAL FIBRILLATION: ICD-10-CM

## 2021-06-10 DIAGNOSIS — G47.10 HYPERSOMNOLENCE: ICD-10-CM

## 2021-06-10 DIAGNOSIS — E78.5 DYSLIPIDEMIA: ICD-10-CM

## 2021-06-10 DIAGNOSIS — Z98.890 S/P ABLATION OF ATRIAL FIBRILLATION: ICD-10-CM

## 2021-06-10 DIAGNOSIS — I48.0 PAROXYSMAL ATRIAL FIBRILLATION (HCC): ICD-10-CM

## 2021-06-10 DIAGNOSIS — G47.33 OSA ON CPAP: ICD-10-CM

## 2021-06-10 DIAGNOSIS — I48.19 PERSISTENT ATRIAL FIBRILLATION (HCC): ICD-10-CM

## 2021-06-10 DIAGNOSIS — I42.0 DILATED CARDIOMYOPATHY (HCC): Primary | ICD-10-CM

## 2021-06-10 DIAGNOSIS — I73.9 CLAUDICATION (HCC): ICD-10-CM

## 2021-06-10 DIAGNOSIS — Z99.89 OSA ON CPAP: ICD-10-CM

## 2021-06-10 PROCEDURE — 99214 OFFICE O/P EST MOD 30 MIN: CPT | Performed by: INTERNAL MEDICINE

## 2021-06-10 RX ORDER — CARVEDILOL 6.25 MG/1
6.25 TABLET ORAL 2 TIMES DAILY
Qty: 60 TABLET | Refills: 3 | Status: SHIPPED | OUTPATIENT
Start: 2021-06-10

## 2021-06-10 NOTE — PROGRESS NOTES
CARDIOLOGY  NOTE    Chief Complaint: CHF    HPI:   Brody Rodriguez is a 50y.o. 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 HE takes coreg few times a week and has stopped muscle relaxants and gabapentin    Blood pressure is high today but he thinks is because of back pain. .he states that he stopped taking all his medication few months ago he denies any palpitations his legs still hurt although close arterial Doppler were normal.  He says after stopping medication his legs stopped hurting. He has no shortness of breath but his main complaint is that he has no energy and would like to have more strength he feels like he does not have the same strength anymore. He was  sad and depressed , parents read the will and he did not get any inheritance but he is \"over it now\". HE feels his \"heart has broken\" . HE is s/p Afib ablation . He is feeling much better . last echo in Nov 2018 showed improvement of his EF to normal range   Cath showed no significant cad . 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 He is able to continue doing very hard work. Father passed away which was difficult for him      Current Outpatient Medications   Medication Sig Dispense Refill    carvedilol (COREG) 3.125 MG tablet Take 1 tablet by mouth daily 90 tablet 1    CPAP Machine MISC by Does not apply route       No current facility-administered medications for this visit. Allergies:   Penicillins    Patient History:  Past Medical History:   Diagnosis Date    [address]     Breast mass     Dyspnea     H/O echocardiogram 10/29/2019    EF 55-60%, Mild bilateral atrial and right ventricular dilatation.     History of echocardiogram 11/28/2018    Limited, EF 55-60%    History of stress test 01/08/2018    EF 32% with global hypokinesis pt was in afib during exam. needs invasive work up for further discussion    Hypercholesterolemia     Melena     Snoring      Past Surgical History:   Procedure Laterality Date    ATRIAL ABLATION SURGERY N/A 04/25/2018    AFIB    BACK SURGERY      3- discectomy    CARPAL TUNNEL RELEASE Bilateral 1983    Carpal tunnel bilatera    ELBOW SURGERY      bicep muscle tendon repair    LITHOTRIPSY       Family History   Problem Relation Age of Onset    Hypotension Father     Diabetes Father     Asthma Maternal Grandmother      Social History     Tobacco Use    Smoking status: Never Smoker    Smokeless tobacco: Never Used   Substance Use Topics    Alcohol use: No        Review of Systems:   · Constitutional: No Fever or Weight Loss   · Eyes: No Decreased Vision  · ENT: No Headaches, Hearing Loss or Vertigo  · Cardiovascular: as per note above   · Respiratory: No cough or wheezing and as per note above. · Gastrointestinal: No abdominal pain, appetite loss, blood in stools, constipation, diarrhea or heartburn  · Genitourinary: No dysuria, trouble voiding, or hematuria  · Musculoskeletal:  None  · Integumentary: No rash or pruritis  · Neurological: No TIA or stroke symptoms  · Psychiatric: No anxiety or depression  · Endocrine: No malaise, fatigue or temperature intolerance  · Hematologic/Lymphatic: No bleeding problems, blood clots or swollen lymph nodes  · Allergic/Immunologic: No nasal congestion or hives    Objective:      Physical Exam:  BP (!) 150/86   Pulse 74   Resp 16   Ht 6' 4\" (1.93 m)   Wt (!) 340 lb 12.8 oz (154.6 kg)   SpO2 97%   BMI 41.48 kg/m²   Wt Readings from Last 3 Encounters:   06/10/21 (!) 340 lb 12.8 oz (154.6 kg)   07/09/20 (!) 308 lb (139.7 kg)   09/26/19 (!) 353 lb (160.1 kg)     Body mass index is 41.48 kg/m².   Vitals:    06/10/21 0828   BP: (!) 150/86   Pulse: 74   Resp: 16   SpO2: 97%        General Appearance:  No distress, conversant  Constitutional:  Well developed, Well nourished, No acute distress, Non-toxic appearance. HENT:  Normocephalic, Atraumatic, Bilateral external ears normal, Oropharynx moist, No oral exudates, Nose normal. Neck- Normal range of motion, No tenderness, Supple, No stridor,no apical-carotid delay  Eyes:  PERRL, EOMI, Conjunctiva normal, No discharge. Respiratory:  Normal breath sounds, No respiratory distress, No wheezing, No chest tenderness. ,no use of accessory muscles,   Cardiovascular: (PMI) apex non displaced,no lifts no thrills,S1 and S2 audible, No added heart sounds, No signs of ankle edema, or volume overload, No evidence of JVD  GI:  Bowel sounds normal, Soft, No tenderness, No masses, No gross visceromegaly   :  No costovertebral angle tenderness   Musculoskeletal:  No edema, no tenderness, no deformities.  Back- no tenderness  Integument:  Well hydrated, no rash   Lymphatic:  No lymphadenopathy noted   Neurologic:  Alert & oriented x 3, CN 2-12 normal, normal motor function, normal sensory function, no focal deficits noted   Psychiatric:  Speech and behavior appropriate       Medical decision making and Data review:  DATA:  No results found for: TROPONINT  BNP:  No results found for: PROBNP  PT/INR:  No results found for: PTINR  No results found for: LABA1C  Lab Results   Component Value Date    CHOL 233 01/08/2018    TRIG 118 01/08/2018    HDL 44 01/08/2018    LDLCALC 165 (A) 01/08/2018    LDLDIRECT 169 (H) 01/05/2018     Lab Results   Component Value Date    ALT 46 01/05/2018    AST 34 01/05/2018     TSH:   Lab Results   Component Value Date    TSH 1.98 01/05/2018     Echo 1/8/18   Summary   Atrial fibrillation.   Left ventricular systolic function is mildly depressed with an ejection   fraction of 25 to 30 %   Mild concentric left ventricular hypertrophy.   Left Ventricular size is dilated.   Moderate bilateral atrial dilatation.   Doppler evaluation reveals mild aortic, mitral, tricuspid regurgitation.   No evidence of pericardial effusion.    stress

## 2021-11-05 ENCOUNTER — OFFICE VISIT (OUTPATIENT)
Dept: CARDIOLOGY CLINIC | Age: 56
End: 2021-11-05
Payer: COMMERCIAL

## 2021-11-05 VITALS
BODY MASS INDEX: 38.36 KG/M2 | WEIGHT: 315 LBS | SYSTOLIC BLOOD PRESSURE: 146 MMHG | HEART RATE: 84 BPM | HEIGHT: 76 IN | DIASTOLIC BLOOD PRESSURE: 94 MMHG

## 2021-11-05 DIAGNOSIS — I42.0 DILATED CARDIOMYOPATHY (HCC): Primary | ICD-10-CM

## 2021-11-05 PROCEDURE — 93000 ELECTROCARDIOGRAM COMPLETE: CPT | Performed by: NURSE PRACTITIONER

## 2021-11-05 PROCEDURE — G8484 FLU IMMUNIZE NO ADMIN: HCPCS | Performed by: NURSE PRACTITIONER

## 2021-11-05 PROCEDURE — 99214 OFFICE O/P EST MOD 30 MIN: CPT | Performed by: NURSE PRACTITIONER

## 2021-11-05 PROCEDURE — G8427 DOCREV CUR MEDS BY ELIG CLIN: HCPCS | Performed by: NURSE PRACTITIONER

## 2021-11-05 PROCEDURE — 1036F TOBACCO NON-USER: CPT | Performed by: NURSE PRACTITIONER

## 2021-11-05 PROCEDURE — 3017F COLORECTAL CA SCREEN DOC REV: CPT | Performed by: NURSE PRACTITIONER

## 2021-11-05 PROCEDURE — G8417 CALC BMI ABV UP PARAM F/U: HCPCS | Performed by: NURSE PRACTITIONER

## 2021-11-05 RX ORDER — ASPIRIN 325 MG
325 TABLET ORAL DAILY
COMMUNITY

## 2021-11-05 ASSESSMENT — ENCOUNTER SYMPTOMS
COUGH: 0
SHORTNESS OF BREATH: 0

## 2021-11-05 NOTE — PROGRESS NOTES
Nimco Bolton MD, Wilfredo Palacio MD, [facility], MD, MD Raquel Barrera, MD Bhargav Pena MD Wendelin Deis, APRAMEYA Regalado, APRAMEYA Cristina, [facility], APRN    CARDIOLOGY  NOTE    2021    Tiffanie Myles (:  1965) is a 49 y.o. male,Established patient with Dr. Cee Rm, here for evaluation of the following chief complaint(s):  6 Month Follow-Up (Patient reports just getting over a \"cold\", states was tested for COVID adn was negative. States he gets this every year when the combines start. Denies SOB and edema.), Cardiomyopathy, Chest Pain (Still c/o dull ache, states has been there since start of cardiac issues.), and Palpitations (Reports feeling a flutter \"once in a while\". )    SUBJECTIVE/OBJECTIVE:    Jacinto Silver is a 50y.o. 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 HE takes coreg few times a week and has stopped muscle relaxants and gabapentin    Blood pressure is higher today, but better than previous visits. He states that he has been rushed today. He continues to not take most medications. He denies palpitations his legs have not been hurting since being more active and stopping his platel. arterial Doppler were normal.  He says after stopping medication his legs stopped hurting. He has no shortness of breath but his main complaint is that he has no energy and would like to have more strength he feels like he does not have the same strength anymore. He was sad and depressed, parents read the will and he did not get any inheritance but he is \"over it now\". HE feels his \"heart has broken\" . HE is s/p Afib ablation . He is feeling much better recently. He states that farther away from his mother he gets the better he is feeling. last echo in 2018 showed improvement of his EF to normal range   Cath showed no significant cad . 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 He is able to continue doing very hard work. Father passed away which was difficult for him      Review of Systems   Constitutional: Negative for fatigue and fever. Respiratory: Negative for cough and shortness of breath. Cardiovascular: Negative for chest pain, palpitations and leg swelling. Musculoskeletal: Negative for arthralgias and gait problem. Neurological: Negative for dizziness, syncope, weakness, light-headedness and headaches. Vitals:    11/05/21 0825 11/05/21 0831   BP: (!) 142/94 (!) 146/94   Pulse: 84    Weight: (!) 347 lb 12.8 oz (157.8 kg)    Height: 6' 4\" (1.93 m)        Wt Readings from Last 3 Encounters:   11/05/21 (!) 347 lb 12.8 oz (157.8 kg)   06/10/21 (!) 340 lb 12.8 oz (154.6 kg)   07/09/20 (!) 308 lb (139.7 kg)       BP Readings from Last 3 Encounters:   11/05/21 (!) 146/94   06/10/21 (!) 150/86   07/09/20 (!) 142/102       Prior to Admission medications    Medication Sig Start Date End Date Taking? Authorizing Provider   aspirin 325 MG tablet Take 325 mg by mouth daily   Yes Historical Provider, MD   carvedilol (COREG) 6.25 MG tablet Take 1 tablet by mouth 2 times daily 6/10/21  Yes Oniel López MD   CPAP Machine MISC by Does not apply route   Yes Historical Provider, MD       Physical Exam  Vitals reviewed. Constitutional:       General: He is not in acute distress. Appearance: Normal appearance. He is obese. He is not ill-appearing. HENT:      Head: Atraumatic. Neck:      Vascular: No carotid bruit. Cardiovascular:      Rate and Rhythm: Normal rate and regular rhythm. Pulses: Normal pulses. Heart sounds: Normal heart sounds. No murmur heard. Pulmonary:      Effort: Pulmonary effort is normal. No respiratory distress. Breath sounds: Normal breath sounds. Musculoskeletal:         General: No swelling or deformity. Cervical back: Neck supple. No muscular tenderness. Neurological:      Mental Status: He is alert. Health Maintenance   Topic Date Due    Hepatitis C screen  Never done    Pneumococcal 0-64 years Vaccine (1 of 2 - PPSV23) Never done    COVID-19 Vaccine (1) Never done    HIV screen  Never done    DTaP/Tdap/Td vaccine (1 - Tdap) Never done    Diabetes screen  Never done    Colon cancer screen colonoscopy  Never done    Shingles Vaccine (1 of 2) Never done    Flu vaccine (1) Never done    Lipid screen  01/08/2023    Hepatitis A vaccine  Aged Out    Hepatitis B vaccine  Aged Out    Hib vaccine  Aged Out    Meningococcal (ACWY) vaccine  Aged Out       Lab Review   Lab Results   Component Value Date    CHOL 233 01/08/2018    TRIG 118 01/08/2018    HDL 44 01/08/2018    LDLDIRECT 169 01/05/2018        Note reviewed from :     Echo 1/8/18   Summary   Atrial fibrillation.   Left ventricular systolic function is mildly depressed with an ejection   fraction of 25 to 30 %   Mild concentric left ventricular hypertrophy.   Left Ventricular size is dilated.   Moderate bilateral atrial dilatation.   Doppler evaluation reveals mild aortic, mitral, tricuspid regurgitation.   No evidence of pericardial effusion.    stress test 1/8/18  Summary    A. Fib may affect EF results adversely    Supervising physician Dr. Terrence Skinner .   Gayle Goel specific T wave inversion in lat leads but negative for ischemia by    diagnostic criteria    Abnormal reduced EF 32 % with global hypokinesis, patient was in afib during    exam    Moderate intensity medium size lat wall reversible defect in stress images    Fixed inferior defect consisted with diaphragmatic artifact    dilated left ventricle   Cath 3/14/18  Procedure Summary   Radial Access, Normal coronaries. LVEDp was 18 mmHG   No significant CAD, Right dominant system   Pt noted to be in afib      Angiographic Findings    Diagnostic Findings    Cardiac Arteries and Lesion Findings     LMCA: Normal and No Angiographicalyl Significant Disease. LAD: Normal (no stenosis %) and No Angiographicalyl Significant Disease. Type  III vessel , 2 large diagonal are widely patent  LCx: widely patent  RCA: Normal (no stenosis %) and No Angiographicalyl Significant Disease. gives  PDA , it is widely patent , right dominant vessel, large calibre vessel     Echo 11/28/18  Summary   This is a limited echo to assess ejection fraction and regional wall motion.   Left ventricular systolic function is normal. Ejection fraction of 55-60%.   EF has improved compared to previous echo in Jan 2018   Normal pulmonary artery pressure.         ASSESSMENT/PLAN:    Non Ischemic cardiomyopathy  Probably had tachycardia induced cardiomyopathy in setting of uncontrolled A. fib no significant CAD Non ischemic . Cardiomyopathy could be A. Fib related. I also think he is depressed pression , since Ef is better. In normal in spite of not being on meds I did ask him to Continue coreg 6.25mg BID. He states that he is doing better about taking them BID. His BP is better. WE again talked about compliance . HE has sleep apnea.     Persistent atrial fibrillation [facility])  He has failed cardioversion twice  ( jan 2018 and march 2018). He the underwent afib ablation  In April 2018 , he is off all anticoagulation but I think it should  he should take  Coreg or beta blocker  He not feeling lightheaded or  Dizzy any more . He denies palpitations. .  His chads score is 1 to coagulation needed anymore so we stopped eliquis and continue only aspirin 325 mg daily he reports he is taking it.      Anxiety   I think his anxiety and depression is driving a lot of his problems at this stage which should be a bigger priority asked him to follow-up with his primary care physician and address it. He states that he is doing better now and doesn't need to talk to PCP about it.  I still think it is an issue for him.      Hypertension  We talked extensively I have asked him to check her blood pressure at home counseled him about weight loss and BP management. He has plans to lose a lot of weight. He is concerned about decreasing his blood pressure too much. We came to an agreement for him to call me with his BP in December and if his Blood pressure is > 140/90 we will add another medication.      Claudication  Still has the tingling. No evidence of arterial obstruction based on ultrasound     Precordial pain  Cath shows normal cath      Orthostatic dizziness  Improved now     COVID 19 infection  11/2020 he states that he did okay but his taste and smell have never been the same. Obesity  Discussed weight loss and goal setting         Dyslipidemia  Sara Plata had lab work recently,  Lipid profile was reviewed with patient.     Counseled extensively and medication compliance urged. We discussed that for the  prevention of ASCVD our  goal is aggressive risk modification. Patient is encouraged to exercise even a brisk walk for 30 minutes  at least 3 to 4 times a week   Various goals were discussed and questions answered. Continue current medications. Appropriate prescriptions are addressed and refills ordered. Questions answered and patient verbalizes understanding. Call for any problems, questions, or concerns. Return in about 6 months (around 5/5/2022) for or sonner if needed. An electronic signature was used to authenticate this note.     Electronically signed by JCARLOS Mays CNP on 11/5/2021 at 9:02 AM

## 2022-06-30 ENCOUNTER — OFFICE VISIT (OUTPATIENT)
Dept: CARDIOLOGY CLINIC | Age: 57
End: 2022-06-30
Payer: COMMERCIAL

## 2022-06-30 VITALS
HEIGHT: 77 IN | DIASTOLIC BLOOD PRESSURE: 66 MMHG | BODY MASS INDEX: 27.98 KG/M2 | SYSTOLIC BLOOD PRESSURE: 124 MMHG | HEART RATE: 94 BPM | WEIGHT: 237 LBS

## 2022-06-30 DIAGNOSIS — E78.5 DYSLIPIDEMIA: ICD-10-CM

## 2022-06-30 DIAGNOSIS — I42.8 OTHER CARDIOMYOPATHY (HCC): Primary | ICD-10-CM

## 2022-06-30 DIAGNOSIS — Z86.79 S/P ABLATION OF ATRIAL FIBRILLATION: ICD-10-CM

## 2022-06-30 DIAGNOSIS — R42 ORTHOSTATIC DIZZINESS: ICD-10-CM

## 2022-06-30 DIAGNOSIS — Z98.890 S/P ABLATION OF ATRIAL FIBRILLATION: ICD-10-CM

## 2022-06-30 DIAGNOSIS — I73.9 CLAUDICATION (HCC): ICD-10-CM

## 2022-06-30 DIAGNOSIS — I48.0 PAROXYSMAL ATRIAL FIBRILLATION (HCC): ICD-10-CM

## 2022-06-30 PROCEDURE — 3017F COLORECTAL CA SCREEN DOC REV: CPT | Performed by: INTERNAL MEDICINE

## 2022-06-30 PROCEDURE — G8417 CALC BMI ABV UP PARAM F/U: HCPCS | Performed by: INTERNAL MEDICINE

## 2022-06-30 PROCEDURE — 99214 OFFICE O/P EST MOD 30 MIN: CPT | Performed by: INTERNAL MEDICINE

## 2022-06-30 PROCEDURE — 93000 ELECTROCARDIOGRAM COMPLETE: CPT | Performed by: INTERNAL MEDICINE

## 2022-06-30 PROCEDURE — G8427 DOCREV CUR MEDS BY ELIG CLIN: HCPCS | Performed by: INTERNAL MEDICINE

## 2022-06-30 PROCEDURE — 1036F TOBACCO NON-USER: CPT | Performed by: INTERNAL MEDICINE

## 2022-06-30 RX ORDER — METOPROLOL SUCCINATE 25 MG/1
25 TABLET, EXTENDED RELEASE ORAL DAILY
Qty: 30 TABLET | Refills: 3 | Status: SHIPPED | OUTPATIENT
Start: 2022-06-30

## 2022-06-30 NOTE — PROGRESS NOTES
CARDIOLOGY  NOTE    Chief Complaint: CHF    HPI:   Merline Covington is a 49y.o. 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 HE stopped all his meds and is only taking aspirin he is very happy today and seems to be in great spirits  He states that he stopped taking all his medication few months ago he denies any palpitations his legs still hurt although close arterial Doppler were normal.  He says after stopping medication his legs stopped hurting. He has no shortness of breath    He was  sad and depressed , parents read the will and he did not get any inheritance but he is \"over it now\". HE feels his \"heart has broken\" . HE is s/p Afib ablation . He is feeling much better . last echo in Nov 2018 showed improvement of his EF to normal range   Cath showed no significant cad . 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 He is able to continue doing very hard work. Father passed away which was difficult for him      Current Outpatient Medications   Medication Sig Dispense Refill    metoprolol succinate (TOPROL XL) 25 MG extended release tablet Take 1 tablet by mouth daily 30 tablet 3    aspirin 325 MG tablet Take 325 mg by mouth daily      carvedilol (COREG) 6.25 MG tablet Take 1 tablet by mouth 2 times daily (Patient not taking: Reported on 6/30/2022) 60 tablet 3    CPAP Machine MISC by Does not apply route (Patient not taking: Reported on 6/30/2022)       No current facility-administered medications for this visit. Allergies:   Penicillins    Patient History:  Past Medical History:   Diagnosis Date    Atrial fibrillation ([address]     Breast mass     Dyspnea     H/O echocardiogram 10/29/2019    EF 55-60%, Mild bilateral atrial and right ventricular dilatation.     History of echocardiogram 11/28/2018    Limited, EF 55-60%    History nourished, No acute distress, Non-toxic appearance. HENT:  Normocephalic, Atraumatic, Bilateral external ears normal, Oropharynx moist, No oral exudates, Nose normal. Neck- Normal range of motion, No tenderness, Supple, No stridor,no apical-carotid delay  Eyes:  PERRL, EOMI, Conjunctiva normal, No discharge. Respiratory:  Normal breath sounds, No respiratory distress, No wheezing, No chest tenderness. ,no use of accessory muscles,   Cardiovascular: (PMI) apex non displaced,no lifts no thrills,S1 and S2 audible, No added heart sounds, No signs of ankle edema, or volume overload, No evidence of JVD  GI:  Bowel sounds normal, Soft, No tenderness, No masses, No gross visceromegaly   :  No costovertebral angle tenderness   Musculoskeletal:  No edema, no tenderness, no deformities.  Back- no tenderness  Integument:  Well hydrated, no rash   Lymphatic:  No lymphadenopathy noted   Neurologic:  Alert & oriented x 3, CN 2-12 normal, normal motor function, normal sensory function, no focal deficits noted   Psychiatric:  Speech and behavior appropriate       Medical decision making and Data review:  DATA:  No results found for: TROPONINT  BNP:  No results found for: PROBNP  PT/INR:  No results found for: PTINR  No results found for: LABA1C  Lab Results   Component Value Date    CHOL 233 01/08/2018    TRIG 118 01/08/2018    HDL 44 01/08/2018    LDLCALC 165 (A) 01/08/2018    LDLDIRECT 169 (H) 01/05/2018     Lab Results   Component Value Date    ALT 46 01/05/2018    AST 34 01/05/2018     TSH:   Lab Results   Component Value Date    TSH 1.98 01/05/2018     Echo 1/8/18   Summary   Atrial fibrillation.   Left ventricular systolic function is mildly depressed with an ejection   fraction of 25 to 30 %   Mild concentric left ventricular hypertrophy.   Left Ventricular size is dilated.   Moderate bilateral atrial dilatation.   Doppler evaluation reveals mild aortic, mitral, tricuspid regurgitation.   No evidence of pericardial effusion.    stress test 1/8/18  Summary    A. Fib may affect EF results adversely    Supervising physician Dr. Tyrel Erickson .   Yasir Rands specific T wave inversion in lat leads but negative for ischemia by    diagnostic criteria    Abnormal reduced EF 32 % with global hypokinesis, patient was in afib during    exam    Moderate intensity medium size lat wall reversible defect in stress images    Fixed inferior defect consisted with diaphragmatic artifact    dilated left ventricle   Cath 3/14/18  Procedure Summary   Radial Access, Normal coronaries. LVEDp was 18 mmHG   No significant CAD, Right dominant system   Pt noted to be in afib      Angiographic Findings    Diagnostic Findings    Cardiac Arteries and Lesion Findings     LMCA: Normal and No Angiographicalyl Significant Disease. LAD: Normal (no stenosis %) and No Angiographicalyl Significant Disease. Type  III vessel , 2 large diagonal are widely patent  LCx: widely patent  RCA: Normal (no stenosis %) and No Angiographicalyl Significant Disease. gives  PDA , it is widely patent , right dominant vessel, large calibre vessel    Echo 11/28/18  Summary   This is a limited echo to assess ejection fraction and regional wall motion.   Left ventricular systolic function is normal. Ejection fraction of 55-60%.   EF has improved compared to previous echo in Jan 2018   Normal pulmonary artery pressure.       All labs, medications and tests reviewed by myself including data and history from outside source , patient and available family . Assessment & Plan:      1. Other cardiomyopathy ([address]    2. Claudication ([address]    3. Dyslipidemia    4. Orthostatic dizziness    5. Paroxysmal atrial fibrillation (HCC)    6. S/P ablation of atrial fibrillation       Non Ischemic cardiomyopathy  Probably had tachycardia induced cardiomyopathy in setting of uncontrolled A. fib no significant CAD   Non ischemic . Cardiomyopathy could be A. Fib related  . Ef is better.   In normal in spite of not being on meds he ha stopped taking coreg on his own as he thinks it was making his legs hurt  , WE again talked about compliance . will try metoprolol Xl 25 mg since he thinks coreg gives him leg pain   HE has sleep apnea but he is not compliant with cpap has not used it for few weeks due to it causing headaches    Persistent atrial fibrillation [facility])  He has failed cardioversion twice  ( jan 2018 and march 2018). He the underwent afib ablation  In April 2018 , he is off all anticoagulation but I think it should  he should take  Coreg or beta blocker  He not feeling lightheaded or  Dizzy any more . He denies palpitations. .  His chads score is 1 to coagulation needed anymore so we stopped eliquis and continue only aspirin 325 mg daily but I think he is not taking that either    Anxiety   I think his anxiety and depression is drives a lot of his problems at this stage which should be a bigger priority asked him to follow-up with his primary care physician and address it    Hypertension  We talked extensively I have asked him to check her blood pressure at home counseled him a little large time he is very tangential thought process which makes conversation difficult. He will call me with blood pressure results      claudication  No evidence of arterial obstruction based on ultrasound    Precordial pain  Cath shows normal cath     Orthostatic dizziness   improved now      Dyslipidemia :  Christoph No had lab work recently,  Lipid profile was reviewed with patient. Counseled extensively and medication compliance urged. We discussed that for the  prevention of ASCVD our  goal is aggressive risk modification. Patient is encouraged to exercise even a brisk walk for 30 minutes  at least 3 to 4 times a week   Various goals were discussed and questions answered. Continue current medications. Appropriate prescriptions are addressed and refills ordered. Questions answered and patient verbalizes understanding.   Call for any problems, questions, or concerns. Continue all other medications of all above medical condition listed as is. Return in about 6 months (around 12/30/2022). Please note this report has been partially produced using speech recognition software and may contain errors related to that system including errors in grammar, punctuation, and spelling, as well as words and phrases that may be inappropriate.  If there are any questions or concerns please feel free to contact the dictating provider for clarification.

## 2023-01-12 ENCOUNTER — OFFICE VISIT (OUTPATIENT)
Dept: CARDIOLOGY CLINIC | Age: 58
End: 2023-01-12
Payer: COMMERCIAL

## 2023-01-12 VITALS
SYSTOLIC BLOOD PRESSURE: 142 MMHG | BODY MASS INDEX: 38.36 KG/M2 | DIASTOLIC BLOOD PRESSURE: 90 MMHG | WEIGHT: 315 LBS | HEART RATE: 82 BPM | HEIGHT: 76 IN

## 2023-01-12 DIAGNOSIS — Z86.79 S/P ABLATION OF ATRIAL FIBRILLATION: ICD-10-CM

## 2023-01-12 DIAGNOSIS — I48.19 PERSISTENT ATRIAL FIBRILLATION (HCC): ICD-10-CM

## 2023-01-12 DIAGNOSIS — Z99.89 OSA ON CPAP: ICD-10-CM

## 2023-01-12 DIAGNOSIS — R06.83 SNORING: ICD-10-CM

## 2023-01-12 DIAGNOSIS — I48.0 PAROXYSMAL ATRIAL FIBRILLATION (HCC): ICD-10-CM

## 2023-01-12 DIAGNOSIS — G47.33 OSA ON CPAP: ICD-10-CM

## 2023-01-12 DIAGNOSIS — G47.10 HYPERSOMNOLENCE: ICD-10-CM

## 2023-01-12 DIAGNOSIS — Z98.890 S/P ABLATION OF ATRIAL FIBRILLATION: ICD-10-CM

## 2023-01-12 DIAGNOSIS — R07.2 PRECORDIAL PAIN: ICD-10-CM

## 2023-01-12 DIAGNOSIS — I73.9 CLAUDICATION (HCC): ICD-10-CM

## 2023-01-12 DIAGNOSIS — I42.0 DILATED CARDIOMYOPATHY (HCC): Primary | ICD-10-CM

## 2023-01-12 PROCEDURE — 99214 OFFICE O/P EST MOD 30 MIN: CPT | Performed by: INTERNAL MEDICINE

## 2023-01-12 NOTE — PROGRESS NOTES
CARDIOLOGY  NOTE    Chief Complaint: CHF    HPI:   Mery Rascon is a 56y.o. 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 HE stopped all his meds and is only taking aspirin he is very happy today and seems to be in great spirits. He says he stopped metoprolol as it was making him put on weight he  weighs 350 lbs he has a lot of back pain and ankle pain   He states that he stopped taking all his medication few months ago he denies any palpitations his legs still hurt although close arterial Doppler were normal.  He says after stopping medication his legs stopped hurting. He has no shortness of breath    He was  sad and depressed but happy now  , parents read the will and he did not get any inheritance but he is \"over it now\". HE feels his \"heart has broken\" . HE is s/p Afib ablation . He is feeling much better . last echo in Nov 2018 showed improvement of his EF to normal range   Cath showed no significant cad . 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 He is able to continue doing very hard work. Current Outpatient Medications   Medication Sig Dispense Refill    Omega-3 Fatty Acids (FISH OIL OMEGA-3 PO) Take by mouth      aspirin 325 MG tablet Take 325 mg by mouth daily      CPAP Machine MISC by Does not apply route       No current facility-administered medications for this visit. Allergies:   Penicillins    Patient History:  Past Medical History:   Diagnosis Date    Atrial fibrillation ([address]     Breast mass     Dyspnea     H/O echocardiogram 10/29/2019    EF 55-60%, Mild bilateral atrial and right ventricular dilatation.     History of echocardiogram 11/28/2018    Limited, EF 55-60%    History of stress test 01/08/2018    EF 32% with global hypokinesis pt was in afib during exam. needs invasive work up for further discussion Hypercholesterolemia     Melena     Snoring      Past Surgical History:   Procedure Laterality Date    ATRIAL ABLATION SURGERY N/A 04/25/2018    AFIB    BACK SURGERY      3- discectomy    CARPAL TUNNEL RELEASE Bilateral 1983    Carpal tunnel bilatera    ELBOW SURGERY      bicep muscle tendon repair    LITHOTRIPSY       Family History   Problem Relation Age of Onset    Hypotension Father     Diabetes Father     Asthma Maternal Grandmother      Social History     Tobacco Use    Smoking status: Never    Smokeless tobacco: Never   Substance Use Topics    Alcohol use: Yes     Comment: Rarely/Caffiene - 1 cup of coffee/morning        Review of Systems:   Constitutional: No Fever or Weight Loss   Eyes: No Decreased Vision  ENT: No Headaches, Hearing Loss or Vertigo  Cardiovascular: as per note above   Respiratory: No cough or wheezing and as per note above. Gastrointestinal: No abdominal pain, appetite loss, blood in stools, constipation, diarrhea or heartburn  Genitourinary: No dysuria, trouble voiding, or hematuria  Musculoskeletal:  None  Integumentary: No rash or pruritis  Neurological: No TIA or stroke symptoms  Psychiatric: No anxiety or depression  Endocrine: No malaise, fatigue or temperature intolerance  Hematologic/Lymphatic: No bleeding problems, blood clots or swollen lymph nodes  Allergic/Immunologic: No nasal congestion or hives    Objective:      Physical Exam:  BP (!) 142/90 (Site: Right Upper Arm, Position: Sitting, Cuff Size: Large Adult)   Pulse 82   Ht 6' 4\" (1.93 m)   Wt (!) 350 lb (158.8 kg)   BMI 42.60 kg/m²   Wt Readings from Last 3 Encounters:   01/12/23 (!) 350 lb (158.8 kg)   06/30/22 237 lb (107.5 kg)   11/05/21 (!) 347 lb 12.8 oz (157.8 kg)     Body mass index is 42.6 kg/m². Vitals:    01/12/23 0822   BP: (!) 142/90   Pulse:         General Appearance:  No distress, conversant  Constitutional:  Well developed, Well nourished, No acute distress, Non-toxic appearance.    HENT: Normocephalic, Atraumatic, Bilateral external ears normal, Oropharynx moist, No oral exudates, Nose normal. Neck- Normal range of motion, No tenderness, Supple, No stridor,no apical-carotid delay  Eyes:  PERRL, EOMI, Conjunctiva normal, No discharge. Respiratory:  Normal breath sounds, No respiratory distress, No wheezing, No chest tenderness. ,no use of accessory muscles,   Cardiovascular: (PMI) apex non displaced,no lifts no thrills,S1 and S2 audible, No added heart sounds, No signs of ankle edema, or volume overload, No evidence of JVD  GI:  Bowel sounds normal, Soft, No tenderness, No masses, No gross visceromegaly   :  No costovertebral angle tenderness   Musculoskeletal:  No edema, no tenderness, no deformities. Back- no tenderness  Integument:  Well hydrated, no rash   Lymphatic:  No lymphadenopathy noted   Neurologic:  Alert & oriented x 3, CN 2-12 normal, normal motor function, normal sensory function, no focal deficits noted   Psychiatric:  Speech and behavior appropriate       Medical decision making and Data review:  DATA:  No results found for: TROPONINT  BNP:  No results found for: PROBNP  PT/INR:  No results found for: PTINR  No results found for: LABA1C  Lab Results   Component Value Date    CHOL 233 01/08/2018    TRIG 118 01/08/2018    HDL 44 01/08/2018    LDLCALC 165 (A) 01/08/2018    LDLDIRECT 169 (H) 01/05/2018     Lab Results   Component Value Date    ALT 46 01/05/2018    AST 34 01/05/2018     TSH:   Lab Results   Component Value Date    TSH 1.98 01/05/2018     Echo 1/8/18   Summary   Atrial fibrillation. Left ventricular systolic function is mildly depressed with an ejection   fraction of 25 to 30 %   Mild concentric left ventricular hypertrophy. Left Ventricular size is dilated. Moderate bilateral atrial dilatation. Doppler evaluation reveals mild aortic, mitral, tricuspid regurgitation. No evidence of pericardial effusion. stress test 1/8/18  Summary    A. Fib may affect EF results adversely    Supervising physician Dr. Digna Pimentel . non specific T wave inversion in lat leads but negative for ischemia by    diagnostic criteria    Abnormal reduced EF 32 % with global hypokinesis, patient was in afib during    exam    Moderate intensity medium size lat wall reversible defect in stress images    Fixed inferior defect consisted with diaphragmatic artifact    dilated left ventricle   Cath 3/14/18  Procedure Summary   Radial Access, Normal coronaries. LVEDp was 18 mmHG   No significant CAD, Right dominant system   Pt noted to be in afib      Angiographic Findings    Diagnostic Findings    Cardiac Arteries and Lesion Findings     LMCA: Normal and No Angiographicalyl Significant Disease. LAD: Normal (no stenosis %) and No Angiographicalyl Significant Disease. Type  III vessel , 2 large diagonal are widely patent  LCx: widely patent  RCA: Normal (no stenosis %) and No Angiographicalyl Significant Disease. gives  PDA , it is widely patent , right dominant vessel, large calibre vessel    Echo 11/28/18  Summary   This is a limited echo to assess ejection fraction and regional wall motion. Left ventricular systolic function is normal. Ejection fraction of 55-60%. EF has improved compared to previous echo in Jan 2018   Normal pulmonary artery pressure. All labs, medications and tests reviewed by myself including data and history from outside source , patient and available family . Assessment & Plan:      1. Dilated cardiomyopathy ([address]    2. Claudication ([address]    3. Hypersomnolence    4. DARYL on CPAP    5. Paroxysmal atrial fibrillation (HCC)    6. Persistent atrial fibrillation ([address]    7. Precordial pain    8. S/P ablation of atrial fibrillation    9. Snoring         Non Ischemic cardiomyopathy  Probably had tachycardia induced cardiomyopathy in setting of uncontrolled A. fib no significant CAD   Non ischemic . Cardiomyopathy could be A. Fib related  . Ef is better.   In normal in spite of not being on meds he ha stopped taking coreg on his own as he thinks it was making his legs hurt  , WE again talked about compliance . he has stopped  metoprolol Xl 25 mg since he thinks coreg gives him leg pain so stopped that too   HE has sleep apnea but he is not compliant with cpap has not used it for few weeks due to it causing headaches  I have asked him to loose weight and will hold off on meds as per his wishes     Persistent atrial fibrillation [facility])  He has failed cardioversion twice  ( jan 2018 and march 2018). He the underwent afib ablation  In April 2018 , he is off all anticoagulation but I think it should  he should take  Coreg or beta blocker ( he does not want to)  He not feeling lightheaded or  Dizzy any more . He denies palpitations. .  His chads score is 1 to coagulation needed anymore so we stopped eliquis and continue only aspirin 325 mg daily but I think he is not taking that either    Anxiety   I think his anxiety and depression is drives a lot of his problems at this stage which should be a bigger priority asked him to follow-up with his primary care physician and address it    Hypertension  We talked extensively I have asked him to check her blood pressure at home counseled him a little large time he is very tangential thought process which makes conversation difficult. He will call me with blood pressure results      claudication  No evidence of arterial obstruction based on ultrasound    Precordial pain  Cath shows normal cath     Orthostatic dizziness   improved now      Dyslipidemia :  Rehan Marcos had lab work recently,  Lipid profile was reviewed with patient. Counseled extensively and medication compliance urged. We discussed that for the  prevention of ASCVD our  goal is aggressive risk modification. Patient is encouraged to exercise even a brisk walk for 30 minutes  at least 3 to 4 times a week   Various goals were discussed and questions answered. Continue current medications.  Appropriate prescriptions are addressed and refills ordered. Questions answered and patient verbalizes understanding. Call for any problems, questions, or concerns. Continue all other medications of all above medical condition listed as is. Return in about 6 months (around 7/12/2023). Please note this report has been partially produced using speech recognition software and may contain errors related to that system including errors in grammar, punctuation, and spelling, as well as words and phrases that may be inappropriate. If there are any questions or concerns please feel free to contact the dictating provider for clarification.

## 2023-01-12 NOTE — PATIENT INSTRUCTIONS
Please be informed that if you contact our office outside of normal business hours the physician on call cannot help with any scheduling or rescheduling issues, procedure instruction questions or any type of medication issue. We advise you for any urgent/emergency that you go to the nearest emergency room! PLEASE CALL OUR OFFICE DURING NORMAL BUSINESS HOURS    Monday - Friday   8 am to 5 pm    [address]: [phone]    [place]:  [phone]  **It is YOUR responsibilty to bring medication bottles and/or updated medication list to [address] [place] [address]. This will allow us to better serve you and all your healthcare needs**  [facility] Locations - No appointment necessary. Sites open Monday to Friday. Call your preferred location for test preparation, business hours and other information you need. [employer] accepts [employer]. [facility] [address] Javier [place], [address] [facility]  Phone: [phone] [facility] [address] [facility]  [address]. [place], [address] [facility]  Phone: [phone]     Thank you for allowing us to care for you today! We want to ensure we can follow your treatment plan and we strive to give you the best outcomes and experience possible. If you ever have a life threatening emergency and call 911 - for an ambulance (EMS)   Our providers can only care for you at:   [facility] or [facility]. Even if you have someone take you or you drive yourself we can only care for you in a [facility] facility. Our providers are not setup at the other healthcare locations!

## 2023-01-12 NOTE — PROGRESS NOTES
Vein \"LEG PROBLEM Questionnaire\"  Do you have prominent leg veins? No   Do you have any skin discoloration? No  Do you have any healed or active sores? No  Do you have swelling of the legs? Yes, at the ankles  Do you have a family history of varicose veins? No  Does your profession involve pro-longed        standing or heavy lifting? Yes  7. Have you been fighting overweight problems? Yes  8. Do you have restless legs? No  9. Do you have any night time cramps? No  10. Do you have any of the following in your legs:        Tiredness I  11. If Yes - Have they worn compression stockings No  12.  If they have worn compression stockings

## 2023-03-23 ENCOUNTER — OFFICE VISIT (OUTPATIENT)
Dept: CARDIOLOGY CLINIC | Age: 58
End: 2023-03-23
Payer: COMMERCIAL

## 2023-03-23 VITALS
WEIGHT: 315 LBS | SYSTOLIC BLOOD PRESSURE: 144 MMHG | BODY MASS INDEX: 38.36 KG/M2 | HEIGHT: 76 IN | DIASTOLIC BLOOD PRESSURE: 100 MMHG | HEART RATE: 76 BPM

## 2023-03-23 DIAGNOSIS — I10 PRIMARY HYPERTENSION: Primary | ICD-10-CM

## 2023-03-23 PROCEDURE — G8427 DOCREV CUR MEDS BY ELIG CLIN: HCPCS | Performed by: NURSE PRACTITIONER

## 2023-03-23 PROCEDURE — 3078F DIAST BP <80 MM HG: CPT | Performed by: NURSE PRACTITIONER

## 2023-03-23 PROCEDURE — 3074F SYST BP LT 130 MM HG: CPT | Performed by: NURSE PRACTITIONER

## 2023-03-23 PROCEDURE — 1036F TOBACCO NON-USER: CPT | Performed by: NURSE PRACTITIONER

## 2023-03-23 PROCEDURE — 99214 OFFICE O/P EST MOD 30 MIN: CPT | Performed by: NURSE PRACTITIONER

## 2023-03-23 PROCEDURE — 3017F COLORECTAL CA SCREEN DOC REV: CPT | Performed by: NURSE PRACTITIONER

## 2023-03-23 PROCEDURE — G8417 CALC BMI ABV UP PARAM F/U: HCPCS | Performed by: NURSE PRACTITIONER

## 2023-03-23 PROCEDURE — G8484 FLU IMMUNIZE NO ADMIN: HCPCS | Performed by: NURSE PRACTITIONER

## 2023-03-23 RX ORDER — METOPROLOL SUCCINATE 25 MG/1
25 TABLET, EXTENDED RELEASE ORAL DAILY
COMMUNITY
End: 2023-03-23

## 2023-03-23 RX ORDER — AMLODIPINE BESYLATE 5 MG/1
5 TABLET ORAL DAILY
Qty: 30 TABLET | Refills: 3 | Status: SHIPPED | OUTPATIENT
Start: 2023-03-23

## 2023-03-23 RX ORDER — M-VIT,TX,IRON,MINS/CALC/FOLIC 27MG-0.4MG
1 TABLET ORAL DAILY
COMMUNITY

## 2023-03-23 ASSESSMENT — ENCOUNTER SYMPTOMS
COUGH: 0
SHORTNESS OF BREATH: 0

## 2023-03-23 NOTE — PROGRESS NOTES
CARDIOLOGY  NOTE    3/23/2023    Trudy Patino (:  1965) is a 56 y.o. Claribel Enloe established patient with Dr. Tiffanie Atwood, here for evaluation of the following chief complaint(s):  Cardiomyopathy and Atrial Fibrillation        SUBJECTIVE/OBJECTIVE:    BECKI Tuttle has a past medical history as listed below. He dropped a rock on himself and is in a lot of pain. He states that since he has been hurt that his blood pressure has been very high. He resumed metoprolol. He is high end . HE stopped all his meds and is only taking aspirin he is very happy today and seems to be in great spirits. He says he stopped metoprolol as it was making him put on weight he  weighs 350 lbs he has a lot of back pain and ankle pain   He states that he stopped taking all his medication few months ago he denies any palpitations his legs still hurt although close arterial Doppler were normal.  He says after stopping medication his legs stopped hurting. He has no shortness of breath    He was  sad and depressed but happy now  , parents read the will and he did not get any inheritance but he is \"over it now\". HE feels his \"heart has broken\" . HE is s/p Afib ablation . He is feeling much better . last echo in 2018 showed improvement of his EF to normal range   Cath showed no significant cad . 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 He is able to continue doing very hard work. Review of Systems   Constitutional:  Negative for fatigue and fever. Respiratory:  Negative for cough and shortness of breath. Cardiovascular:  Negative for chest pain, palpitations and leg swelling. Musculoskeletal:  Negative for arthralgias and gait problem. Neurological:  Negative for dizziness, syncope, weakness, light-headedness and headaches.      Vitals:    23 1641 23 1649   BP: (!) 144/100 (!) 144/100   Pulse: 76    Weight: (!) 352 lb (159.7 kg)

## 2023-03-23 NOTE — PROGRESS NOTES
Atrial Fibrillation CHADSVASC2 Score Stroke Risk:   56 y.o. <65 - 0    male Male - 0   CHF HX: No - 0   HTN HX: Yes - 1   Stroke/TIA/Thromboembolism No - 0   Vascular Disease HX: No - 0   Diabetes Mellitus No - 0   CHADSVASC 2 Score 1      Annual Stroke Risk 0.6% - low-moderate risk

## 2023-04-27 ENCOUNTER — NURSE ONLY (OUTPATIENT)
Dept: CARDIOLOGY CLINIC | Age: 58
End: 2023-04-27

## 2023-04-27 VITALS — SYSTOLIC BLOOD PRESSURE: 132 MMHG | DIASTOLIC BLOOD PRESSURE: 78 MMHG | HEART RATE: 82 BPM

## 2023-04-27 DIAGNOSIS — I10 PRIMARY HYPERTENSION: Primary | ICD-10-CM

## 2023-05-11 ENCOUNTER — NURSE ONLY (OUTPATIENT)
Dept: CARDIOLOGY CLINIC | Age: 58
End: 2023-05-11
Payer: COMMERCIAL

## 2023-05-11 VITALS — DIASTOLIC BLOOD PRESSURE: 80 MMHG | HEART RATE: 82 BPM | OXYGEN SATURATION: 95 % | SYSTOLIC BLOOD PRESSURE: 138 MMHG

## 2023-05-11 DIAGNOSIS — I10 PRIMARY HYPERTENSION: Primary | ICD-10-CM

## 2023-05-11 PROCEDURE — 99214 OFFICE O/P EST MOD 30 MIN: CPT | Performed by: NURSE PRACTITIONER

## 2023-05-11 PROCEDURE — 3075F SYST BP GE 130 - 139MM HG: CPT | Performed by: NURSE PRACTITIONER

## 2023-05-11 PROCEDURE — 3078F DIAST BP <80 MM HG: CPT | Performed by: NURSE PRACTITIONER

## 2023-05-11 RX ORDER — HYDROCHLOROTHIAZIDE 25 MG/1
25 TABLET ORAL EVERY MORNING
Qty: 30 TABLET | Refills: 5 | Status: SHIPPED | OUTPATIENT
Start: 2023-05-11

## 2023-05-11 NOTE — PROGRESS NOTES
BP Check Visit    Pt is here for a 2 week BP check. At the last office visit patient was found to have a blood pressure of 132/78. At that time the patient was instructed to eliminate sodium and monitor BP at home and follow up for recheck. BP Readings from Last 3 Encounters:   05/11/23 138/80   04/27/23 132/78   04/12/23 (!) 142/80     Will start HCTZ 25 mg daily. Continue norvasc 10 mg Daily.      Plan for pt is to follow up in 4 weeks for BP check       Pt is to report any dizziness or syncope to the office    Electronically signed by JCARLOS Hylton CNP on 5/11/2023 at 4:31 PM

## 2023-06-08 ENCOUNTER — APPOINTMENT (OUTPATIENT)
Dept: CT IMAGING | Age: 58
End: 2023-06-08
Payer: COMMERCIAL

## 2023-06-08 ENCOUNTER — HOSPITAL ENCOUNTER (EMERGENCY)
Age: 58
Discharge: HOME | End: 2023-06-08
Attending: EMERGENCY MEDICINE
Payer: COMMERCIAL

## 2023-06-08 VITALS
HEIGHT: 76 IN | WEIGHT: 315 LBS | DIASTOLIC BLOOD PRESSURE: 97 MMHG | SYSTOLIC BLOOD PRESSURE: 141 MMHG | BODY MASS INDEX: 38.36 KG/M2 | HEART RATE: 90 BPM | TEMPERATURE: 99.5 F | OXYGEN SATURATION: 94 % | RESPIRATION RATE: 18 BRPM

## 2023-06-08 DIAGNOSIS — M54.50 RIGHT-SIDED LOW BACK PAIN WITHOUT SCIATICA, UNSPECIFIED CHRONICITY: Primary | ICD-10-CM

## 2023-06-08 LAB
ALBUMIN SERPL-MCNC: 4 GM/DL (ref 3.4–5)
ALP BLD-CCNC: 61 IU/L (ref 40–129)
ALT SERPL-CCNC: 43 U/L (ref 10–40)
ANION GAP SERPL CALCULATED.3IONS-SCNC: 9 MMOL/L (ref 4–16)
AST SERPL-CCNC: 39 IU/L (ref 15–37)
BACTERIA: NEGATIVE /HPF
BASOPHILS ABSOLUTE: 0 K/CU MM
BASOPHILS RELATIVE PERCENT: 0.4 % (ref 0–1)
BILIRUB SERPL-MCNC: 0.7 MG/DL (ref 0–1)
BILIRUBIN URINE: NEGATIVE MG/DL
BLOOD, URINE: ABNORMAL
BUN SERPL-MCNC: 11 MG/DL (ref 6–23)
CALCIUM SERPL-MCNC: 9 MG/DL (ref 8.3–10.6)
CAST TYPE: NORMAL /HPF
CHLORIDE BLD-SCNC: 101 MMOL/L (ref 99–110)
CLARITY: CLEAR
CO2: 27 MMOL/L (ref 21–32)
COLOR: YELLOW
CREAT SERPL-MCNC: 0.8 MG/DL (ref 0.9–1.3)
CRYSTAL TYPE: NEGATIVE /HPF
DIFFERENTIAL TYPE: ABNORMAL
EOSINOPHILS ABSOLUTE: 0 K/CU MM
EOSINOPHILS RELATIVE PERCENT: 0 % (ref 0–3)
EPITHELIAL CELLS, UA: NEGATIVE /HPF
GFR SERPL CREATININE-BSD FRML MDRD: >60 ML/MIN/1.73M2
GLUCOSE SERPL-MCNC: 110 MG/DL (ref 70–99)
GLUCOSE, URINE: NEGATIVE MG/DL
HCT VFR BLD CALC: 44.1 % (ref 42–52)
HEMOGLOBIN: 14.7 GM/DL (ref 13.5–18)
IMMATURE NEUTROPHIL %: 0.4 % (ref 0–0.43)
KETONES, URINE: NEGATIVE MG/DL
LEUKOCYTE ESTERASE, URINE: NEGATIVE
LYMPHOCYTES ABSOLUTE: 0.7 K/CU MM
LYMPHOCYTES RELATIVE PERCENT: 9.9 % (ref 24–44)
MCH RBC QN AUTO: 28.2 PG (ref 27–31)
MCHC RBC AUTO-ENTMCNC: 33.3 % (ref 32–36)
MCV RBC AUTO: 84.6 FL (ref 78–100)
MONOCYTES ABSOLUTE: 0.8 K/CU MM
MONOCYTES RELATIVE PERCENT: 11.3 % (ref 0–4)
NITRITE URINE, QUANTITATIVE: NEGATIVE
PDW BLD-RTO: 13.5 % (ref 11.7–14.9)
PH, URINE: 6 (ref 5–8)
PLATELET # BLD: 215 K/CU MM (ref 140–440)
PMV BLD AUTO: 9.6 FL (ref 7.5–11.1)
POTASSIUM SERPL-SCNC: 4.2 MMOL/L (ref 3.5–5.1)
PROTEIN UA: ABNORMAL MG/DL
RBC # BLD: 5.21 M/CU MM (ref 4.6–6.2)
RBC URINE: 2 /HPF (ref 0–3)
SEGMENTED NEUTROPHILS ABSOLUTE COUNT: 5.4 K/CU MM
SEGMENTED NEUTROPHILS RELATIVE PERCENT: 78 % (ref 36–66)
SODIUM BLD-SCNC: 137 MMOL/L (ref 135–145)
SPECIFIC GRAVITY UA: 1.02 (ref 1–1.03)
TOTAL IMMATURE NEUTOROPHIL: 0.03 K/CU MM
TOTAL PROTEIN: 7.1 GM/DL (ref 6.4–8.2)
UROBILINOGEN, URINE: 0.2 MG/DL (ref 0.2–1)
WBC # BLD: 6.9 K/CU MM (ref 4–10.5)
WBC UA: 2 /HPF (ref 0–2)

## 2023-06-08 PROCEDURE — 6360000002 HC RX W HCPCS: Performed by: EMERGENCY MEDICINE

## 2023-06-08 PROCEDURE — 87040 BLOOD CULTURE FOR BACTERIA: CPT

## 2023-06-08 PROCEDURE — 74176 CT ABD & PELVIS W/O CONTRAST: CPT

## 2023-06-08 PROCEDURE — 2580000003 HC RX 258: Performed by: EMERGENCY MEDICINE

## 2023-06-08 PROCEDURE — 85025 COMPLETE CBC W/AUTO DIFF WBC: CPT

## 2023-06-08 PROCEDURE — 81001 URINALYSIS AUTO W/SCOPE: CPT

## 2023-06-08 PROCEDURE — 80053 COMPREHEN METABOLIC PANEL: CPT

## 2023-06-08 RX ORDER — KETOROLAC TROMETHAMINE 30 MG/ML
30 INJECTION, SOLUTION INTRAMUSCULAR; INTRAVENOUS ONCE
Status: COMPLETED | OUTPATIENT
Start: 2023-06-08 | End: 2023-06-08

## 2023-06-08 RX ORDER — NAPROXEN 500 MG/1
500 TABLET ORAL 2 TIMES DAILY WITH MEALS
Qty: 20 TABLET | Refills: 0 | Status: SHIPPED | OUTPATIENT
Start: 2023-06-08 | End: 2023-06-18

## 2023-06-08 RX ORDER — DEXAMETHASONE 4 MG/1
4 TABLET ORAL 2 TIMES DAILY WITH MEALS
Qty: 10 TABLET | Refills: 0 | Status: SHIPPED | OUTPATIENT
Start: 2023-06-08 | End: 2023-06-13

## 2023-06-08 RX ORDER — OXYCODONE HYDROCHLORIDE AND ACETAMINOPHEN 5; 325 MG/1; MG/1
1 TABLET ORAL EVERY 6 HOURS PRN
Qty: 12 TABLET | Refills: 0 | Status: SHIPPED | OUTPATIENT
Start: 2023-06-08 | End: 2023-06-11

## 2023-06-08 RX ORDER — ONDANSETRON 2 MG/ML
4 INJECTION INTRAMUSCULAR; INTRAVENOUS EVERY 6 HOURS PRN
Status: DISCONTINUED | OUTPATIENT
Start: 2023-06-08 | End: 2023-06-08 | Stop reason: HOSPADM

## 2023-06-08 RX ORDER — 0.9 % SODIUM CHLORIDE 0.9 %
1000 INTRAVENOUS SOLUTION INTRAVENOUS ONCE
Status: COMPLETED | OUTPATIENT
Start: 2023-06-08 | End: 2023-06-08

## 2023-06-08 RX ORDER — ORPHENADRINE CITRATE 100 MG/1
100 TABLET, EXTENDED RELEASE ORAL 2 TIMES DAILY
Qty: 20 TABLET | Refills: 0 | Status: SHIPPED | OUTPATIENT
Start: 2023-06-08 | End: 2023-06-18

## 2023-06-08 RX ADMIN — HYDROMORPHONE HYDROCHLORIDE 1 MG: 1 INJECTION, SOLUTION INTRAMUSCULAR; INTRAVENOUS; SUBCUTANEOUS at 13:27

## 2023-06-08 RX ADMIN — SODIUM CHLORIDE 1000 ML: 9 INJECTION, SOLUTION INTRAVENOUS at 13:40

## 2023-06-08 RX ADMIN — CEFTRIAXONE SODIUM 1000 MG: 1 INJECTION, POWDER, FOR SOLUTION INTRAMUSCULAR; INTRAVENOUS at 13:45

## 2023-06-08 RX ADMIN — KETOROLAC TROMETHAMINE 30 MG: 30 INJECTION, SOLUTION INTRAMUSCULAR; INTRAVENOUS at 13:46

## 2023-06-08 ASSESSMENT — PAIN DESCRIPTION - FREQUENCY: FREQUENCY: CONTINUOUS

## 2023-06-08 ASSESSMENT — PAIN - FUNCTIONAL ASSESSMENT
PAIN_FUNCTIONAL_ASSESSMENT: 0-10
PAIN_FUNCTIONAL_ASSESSMENT: ACTIVITIES ARE NOT PREVENTED
PAIN_FUNCTIONAL_ASSESSMENT: ACTIVITIES ARE NOT PREVENTED

## 2023-06-08 ASSESSMENT — PAIN DESCRIPTION - ORIENTATION
ORIENTATION: RIGHT
ORIENTATION: RIGHT;LOWER
ORIENTATION: RIGHT

## 2023-06-08 ASSESSMENT — PAIN DESCRIPTION - DESCRIPTORS
DESCRIPTORS: ACHING;DISCOMFORT
DESCRIPTORS: ACHING
DESCRIPTORS: ACHING;DISCOMFORT;SHOOTING

## 2023-06-08 ASSESSMENT — PAIN SCALES - GENERAL
PAINLEVEL_OUTOF10: 2
PAINLEVEL_OUTOF10: 10

## 2023-06-08 ASSESSMENT — PAIN DESCRIPTION - LOCATION
LOCATION: HEAD;FLANK
LOCATION: BACK
LOCATION: FLANK;HEAD

## 2023-06-08 NOTE — PROGRESS NOTES
Discharge education completed with pt, pt demonstrated appropriate teach back, IV's removed, pt has prescription called in to pharmacy, medication's and side effect education completed, pt has discharge paperwork, AVS signed, pt denies any needs or questions at this time.

## 2023-06-08 NOTE — ED PROVIDER NOTES
(!) [phone]  [phone]   Resp: 21 18     Temp:       TempSrc:       SpO2: 95%  94% 90%   Weight:       Height:         Patient lab results unremarkable urinalysis negative for any infection there is no kidney stones his blood counts are normal blood cultures are obtained with the presumption of of kidney infection he was given a dose of Rocephin. Patient will be discharged home with a prescription for Naprosyn Flexeril and dexamethasone for low back pain  Differential diagnosis: Abdominal Aortic Aneurysm, Ischemic Bowel, Bowel Obstruction, Acute Cholecystitis, Acute Appendicitis, other    FINAL IMPRESSION    1.  Right-sided low back pain without sciatica, unspecified chronicity        Discharged home with prescription for Naprosyn Norflex Decadron and Percocet      (Please note that this note was completed with a voice recognition program.  Every attempt was made to edit the dictations, but inevitably there remain words that are mis-transcribed.)        Octaviano Taylor MD  06/08/23 1888

## 2023-06-11 LAB
CULTURE: NORMAL
CULTURE: NORMAL
Lab: NORMAL
Lab: NORMAL
SPECIMEN: NORMAL
SPECIMEN: NORMAL

## 2023-06-21 ENCOUNTER — TELEPHONE (OUTPATIENT)
Dept: CARDIOLOGY CLINIC | Age: 58
End: 2023-06-21

## 2023-06-21 RX ORDER — HYDROCHLOROTHIAZIDE 25 MG/1
25 TABLET ORAL EVERY MORNING
Qty: 30 TABLET | Refills: 5 | Status: SHIPPED | OUTPATIENT
Start: 2023-06-21

## 2023-07-13 ENCOUNTER — OFFICE VISIT (OUTPATIENT)
Dept: CARDIOLOGY CLINIC | Age: 58
End: 2023-07-13
Payer: COMMERCIAL

## 2023-07-13 VITALS
SYSTOLIC BLOOD PRESSURE: 128 MMHG | OXYGEN SATURATION: 97 % | BODY MASS INDEX: 38.36 KG/M2 | WEIGHT: 315 LBS | HEART RATE: 92 BPM | DIASTOLIC BLOOD PRESSURE: 80 MMHG | RESPIRATION RATE: 16 BRPM | HEIGHT: 76 IN

## 2023-07-13 DIAGNOSIS — I73.9 CLAUDICATION (HCC): ICD-10-CM

## 2023-07-13 DIAGNOSIS — I42.0 DILATED CARDIOMYOPATHY (HCC): ICD-10-CM

## 2023-07-13 DIAGNOSIS — I10 PRIMARY HYPERTENSION: Primary | ICD-10-CM

## 2023-07-13 DIAGNOSIS — Z98.890 S/P ABLATION OF ATRIAL FIBRILLATION: ICD-10-CM

## 2023-07-13 DIAGNOSIS — G47.33 OSA ON CPAP: ICD-10-CM

## 2023-07-13 DIAGNOSIS — I48.0 PAROXYSMAL ATRIAL FIBRILLATION (HCC): ICD-10-CM

## 2023-07-13 DIAGNOSIS — E66.01 CLASS 3 SEVERE OBESITY DUE TO EXCESS CALORIES WITH SERIOUS COMORBIDITY AND BODY MASS INDEX (BMI) OF 40.0 TO 44.9 IN ADULT (HCC): ICD-10-CM

## 2023-07-13 DIAGNOSIS — E78.5 DYSLIPIDEMIA: ICD-10-CM

## 2023-07-13 DIAGNOSIS — Z86.79 S/P ABLATION OF ATRIAL FIBRILLATION: ICD-10-CM

## 2023-07-13 DIAGNOSIS — Z99.89 OSA ON CPAP: ICD-10-CM

## 2023-07-13 PROCEDURE — 3074F SYST BP LT 130 MM HG: CPT | Performed by: NURSE PRACTITIONER

## 2023-07-13 PROCEDURE — 1036F TOBACCO NON-USER: CPT | Performed by: NURSE PRACTITIONER

## 2023-07-13 PROCEDURE — 99214 OFFICE O/P EST MOD 30 MIN: CPT | Performed by: NURSE PRACTITIONER

## 2023-07-13 PROCEDURE — G8427 DOCREV CUR MEDS BY ELIG CLIN: HCPCS | Performed by: NURSE PRACTITIONER

## 2023-07-13 PROCEDURE — 93000 ELECTROCARDIOGRAM COMPLETE: CPT | Performed by: NURSE PRACTITIONER

## 2023-07-13 PROCEDURE — 3017F COLORECTAL CA SCREEN DOC REV: CPT | Performed by: NURSE PRACTITIONER

## 2023-07-13 PROCEDURE — G8417 CALC BMI ABV UP PARAM F/U: HCPCS | Performed by: NURSE PRACTITIONER

## 2023-07-13 PROCEDURE — 3079F DIAST BP 80-89 MM HG: CPT | Performed by: NURSE PRACTITIONER

## 2023-07-13 RX ORDER — ASPIRIN 81 MG/1
81 TABLET ORAL DAILY
COMMUNITY

## 2023-07-13 ASSESSMENT — ENCOUNTER SYMPTOMS
COUGH: 0
SHORTNESS OF BREATH: 0

## 2023-07-13 NOTE — PROGRESS NOTES
CARDIOLOGY  NOTE    2023    Raymon Cyr (:  1965) is a 56 y.o. Lidya Sat established patient with Dr. Ramila Ruiz, here for evaluation of the following chief complaint(s):  6 Month Follow-Up, Atrial Fibrillation, Swelling (Right ankle swelling. Patient was unable to have MRI completed so new order for open MRI has been placed ), and Hypertension (Denies cardiac sx)        SUBJECTIVE/OBJECTIVE:    BECKI Xie has a past medical history as listed below. He dropped a rock on himself and is in a lot of pain. He states that since he has been hurt that his blood pressure has been very high. He resumed metoprolol. He is high end . HE stopped all his meds and is only taking aspirin he is very happy today and seems to be in great spirits. He says he stopped metoprolol as it was making him put on weight he  weighs 350 lbs he has a lot of back pain and ankle pain   He states that he stopped taking all his medication few months ago he denies any palpitations his legs still hurt although close arterial Doppler were normal.  He says after stopping medication his legs stopped hurting. He has no shortness of breath    He was  sad and depressed but happy now  , parents read the will and he did not get any inheritance but he is \"over it now\". HE feels his \"heart has broken\" . HE is s/p Afib ablation . He is feeling much better . last echo in 2018 showed improvement of his EF to normal range   Cath showed no significant cad . 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 He is able to continue doing very hard work. Review of Systems   Constitutional:  Negative for fatigue and fever. Respiratory:  Negative for cough and shortness of breath. Cardiovascular:  Negative for chest pain, palpitations and leg swelling. Musculoskeletal:  Negative for arthralgias and gait problem.    Neurological:  Negative for dizziness, syncope,

## 2023-07-31 ENCOUNTER — HOSPITAL ENCOUNTER (OUTPATIENT)
Dept: PHYSICAL THERAPY | Age: 58
Setting detail: THERAPIES SERIES
Discharge: HOME | End: 2023-07-31
Payer: COMMERCIAL

## 2023-07-31 PROCEDURE — 97162 PT EVAL MOD COMPLEX 30 MIN: CPT

## 2023-07-31 ASSESSMENT — PAIN DESCRIPTION - LOCATION: LOCATION: FOOT

## 2023-07-31 ASSESSMENT — PAIN DESCRIPTION - PAIN TYPE: TYPE: CHRONIC PAIN

## 2023-07-31 ASSESSMENT — PAIN DESCRIPTION - ORIENTATION: ORIENTATION: LEFT

## 2023-07-31 ASSESSMENT — PAIN SCALES - GENERAL: PAINLEVEL_OUTOF10: 7

## 2023-07-31 NOTE — PROGRESS NOTES
Physical Therapy: Initial Evaluation    Patient: Jesscia Meza (79 y.o. male)   Examination Date:   Plan of Care Certification Period: 2023 to        :  1965 ;    Confirmed: Yes MRN: [id number]  CSN: [id number]   Insurance: Payor: Enedina Nunez / Plan: Kee Buchanan / Product Type: *No Product type* /   Insurance ID: [id number] - (Worker's Comp) Secondary Insurance (if applicable):    Referring Physician: Ramonita Duffy, [address]   PCP: ADA Rivera Visits to Date/Visits Approved:   /      No Show/Cancelled Appts:   /       Medical Diagnosis: Sprain of unspecified ligament of right ankle, initial encounter [S93.401A]  Unspecified sprain of left shoulder joint, initial encounter [S43.402A]  Sprain of unspecified parts of lumbar spine and pelvis, initial encounter [S33. 9XXA] R ankle sprain/ L shoulder sprain/lumbar sprain / R thigh contusion  Treatment Diagnosis: R ankle pain     PERTINENT MEDICAL HISTORY   Patient Assessed for Rehabilitation Services: Yes       Medical History: Chart Reviewed: Yes   Past Medical History:   Diagnosis Date    Atrial fibrillation ([address])     Breast mass     Dyspnea     H/O echocardiogram 10/29/2019    EF 55-60%, Mild bilateral atrial and right ventricular dilatation.     History of echocardiogram 2018    Limited, EF 55-60%    History of stress test 2018    EF 32% with global hypokinesis pt was in afib during exam. needs invasive work up for further discussion    Hypercholesterolemia     Melena     Snoring      Surgical History:   Past Surgical History:   Procedure Laterality Date    ATRIAL ABLATION SURGERY N/A 2018    AFIB    BACK SURGERY      3- discectomy    CARPAL TUNNEL RELEASE Bilateral     Carpal tunnel bilatera    ELBOW SURGERY      bicep muscle tendon repair    LITHOTRIPSY         Medications:   Current Outpatient Medications:     aspirin 81 MG EC tablet, Take 1 tablet by mouth daily, Disp: , Rfl:     hydroCHLOROthiazide

## 2023-07-31 NOTE — PLAN OF CARE
Outpatient Physical Therapy           [place]           [] Phone: [phone]   Fax: [phone]  [facility]           [x] Phone: [phone]   Fax: [phone]     To: DO Elio Valdez   From: Raj Machuca PT,    Patient: Tushar Akbar       : 1965  Diagnosis: Sprain of unspecified ligament of right ankle, initial encounter [S93.401A]  Unspecified sprain of left shoulder joint, initial encounter [S43.402A]  Sprain of unspecified parts of lumbar spine and pelvis, initial encounter [S33. 9XXA] Diagnosis: R ankle sprain/ L shoulder sprain/lumbar sprain / R thigh contusion  Treatment Diagnosis: R ankle pain  Date: 2023    Physical Therapy Certification/Re-Certification Form    The following patient has been evaluated for physical therapy services and for therapy to continue, insurance requires physician review of the treatment plan initially and every 90 days. Please review the attached evaluation and/or summary of the patient's plan of care, and verify that you agree therapy should continue by signing the attached document and sending it back to our office.     Assessment:    Assessment: LEFS     Plan of Care/Treatment to date:  [x] Therapeutic Exercise  [] Modalities:  [x] Therapeutic Activity     [] Ultrasound  [] Electrical Stimulation  [x] Gait Training      [] Cervical Traction [] Lumbar Traction  [x] Neuromuscular Re-education    [] Cold/hotpack [] Iontophoresis   [x] Instruction in HEP      [] Vasopneumatic    [] Dry Needling  [x] Manual Therapy               [x] Aquatic Therapy       Other:          Frequency/Duration: 10 visits  # Days per week: [] 1 day # Weeks: [] 1 week [x] 5 weeks     [x] 2 days   [] 2 weeks [] 6 weeks     [] 3 days   [] 3 weeks [] 7 weeks     [] 4 days   [] 4 weeks [] 8 weeks         [] 9 weeks [] 10 weeks         [] 11 weeks [] 12 weeks    Rehab Potential/Progress: [] Excellent [x] Good [] Fair  [] Poor     Goals:    Patient goals: get ankle better  Long

## 2023-08-01 ENCOUNTER — HOSPITAL ENCOUNTER (OUTPATIENT)
Dept: PHYSICAL THERAPY | Age: 58
Setting detail: THERAPIES SERIES
Discharge: HOME | End: 2023-08-01
Payer: COMMERCIAL

## 2023-08-01 PROCEDURE — 97113 AQUATIC THERAPY/EXERCISES: CPT

## 2023-08-01 NOTE — FLOWSHEET NOTE
Minutes:  51    Objective Findings:   entered and exited the pool via steps    Communication with other providers:    Education to Patient:    Adverse Reaction to Treatment:    Assessment: 5/10 at end of session     Treatment/Activity Tolerance:  [x] Patient tolerated treatment well [] Patient limited by fatique  [] Patient limited by pain [] Patient limited by other medical complications  [] Other:     Prognosis: [] Good [] Fair  [] Poor    Patient Requires Follow-up: [] Yes  [] No    Plan:   [x] Continue per plan of care [] Alter current plan (see comments)  [] Plan of care initiated [] Hold pending MD visit [] Discharge    See Weekly Progress Note:    [] Yes [] No Next due:        Electronically signed by:  Salvador Page 8/1/2023, 8:21 AM

## 2023-08-07 ENCOUNTER — HOSPITAL ENCOUNTER (OUTPATIENT)
Dept: PHYSICAL THERAPY | Age: 58
Setting detail: THERAPIES SERIES
Discharge: HOME | End: 2023-08-07
Payer: COMMERCIAL

## 2023-08-07 PROCEDURE — 97113 AQUATIC THERAPY/EXERCISES: CPT

## 2023-08-10 ENCOUNTER — HOSPITAL ENCOUNTER (OUTPATIENT)
Dept: PHYSICAL THERAPY | Age: 58
Setting detail: THERAPIES SERIES
Discharge: HOME | End: 2023-08-10
Payer: COMMERCIAL

## 2023-08-10 PROCEDURE — 97113 AQUATIC THERAPY/EXERCISES: CPT

## 2023-08-10 NOTE — FLOWSHEET NOTE
Physical Therapy Aquatic Flow Sheet   Date:  8/10/2023    Patient Name:  Genoveva Mart    :  1965  Restrictions/Precautions:    Diagnosis:     Treatment Diagnosis:     Insurance/Certification information:    Referring Physician:     Any changes in Ambulatory Summary Sheet?:  Plan of care signed (Y/N):    Visit# / total visits: 4/  Pain level: 6/10 R ankle/hip/low back  L knee  Electronically signed by:  Patricia Lowry PTA    Subjective:  Patient having increased pain today. Has noticed that he has increased pain the second day after his therapy visits.     Key  B= Belt DB= Dumbells T= Theratube   H= Hydrotone N= Noodles W= Weights   P= Paddles S= Speedo equipment K= Kickboard     Exercises/Activities   Warm-up/Amb    Exercises      Slow forward/backward  10'  focus on heel strike and toe off   HR/TR      Slow sideways  5'  Marches  2'    Slow backwards    Mini-squats  2'    Medium forward    3-way SLR  2' ea way B    Medium sideways    Hip circles/fig 8      Small shuffle    Hamstring curls      Jog    Knee extension      Braiding    Pelvic tilts      Bicycling    Scap squeezes          Shoulder flex/ext      Functional    Shoulder abd/add      Step    Shoulder H. abd/add      Lifting    Shoulder IR/ER      Hand to opp knee    Rowing      Push down squat    Bilateral pull down      UE PNF    Push/pull      LE PNF    Push downs      Wall push ups    Arm circles      SLS    Elbow flex/ext          Chin tuck      Stretching    UT shrugs/rolls      Gastroc/Soleus    Rocking horse      Hamstring          SKTC    Other      Piriformis    Dangle  10'  6 noodles     Hip flexor          Ladder pull          Pec stretch          Post deltoid           Treatment Included:  [] Therapeutic Exercise   [] Instruction in HEP  [] Group   [] Therapeutic Activity      [] Neuromuscular Re-education [x] Aquatic   [] Gait             [] Manual  Other:  Time In/ Time Out: 5457/1893    Timed Code Treatment Minutes:  40 aqua

## 2023-08-14 ENCOUNTER — HOSPITAL ENCOUNTER (OUTPATIENT)
Dept: PHYSICAL THERAPY | Age: 58
Setting detail: THERAPIES SERIES
Discharge: HOME | End: 2023-08-14
Payer: COMMERCIAL

## 2023-08-14 PROCEDURE — 97113 AQUATIC THERAPY/EXERCISES: CPT

## 2023-08-25 ENCOUNTER — HOSPITAL ENCOUNTER (OUTPATIENT)
Dept: PHYSICAL THERAPY | Age: 58
Setting detail: THERAPIES SERIES
Discharge: HOME | End: 2023-08-25
Payer: COMMERCIAL

## 2023-08-25 PROCEDURE — 97113 AQUATIC THERAPY/EXERCISES: CPT

## 2023-08-25 NOTE — FLOWSHEET NOTE
Physical Therapy Aquatic Flow Sheet   Date:  2023    Patient Name:  Edwin Tate    :  1965  Restrictions/Precautions:    Diagnosis:     Treatment Diagnosis:     Insurance/Certification information:    Referring Physician:     Any changes in Ambulatory Summary Sheet?:  Plan of care signed (Y/N):    Visit# / total visits: 6/  Pain level: 7/10 R ankle/hip/low back  L knee  Electronically signed by:  Alex Seals PTA                  15mins late    Subjective:  Patient states: \"It's a good 7/10 today. \"      Key  B= Belt DB= Dumbells T= Theratube   H= Hydrotone N= Noodles W= Weights   P= Paddles S= Speedo equipment K= Kickboard     Exercises/Activities   Warm-up/Amb    Exercises      Slow forward/backward  10'  focus on heel strike and toe off   HR/TR      Slow sideways  5'  Marches  2'    Slow backwards    Mini-squats  2'    Medium forward    3-way SLR  2' ea way B    Medium sideways    Hip circles/fig 8      Small shuffle    Hamstring curls      Jog    Knee extension      Braiding    Pelvic tilts      Bicycling    Scap squeezes          Shoulder flex/ext      Functional    Shoulder abd/add      Step    Shoulder H. abd/add      Lifting    Shoulder IR/ER      Hand to opp knee    Rowing      Push down squat    Bilateral pull down      UE PNF    Push/pull      LE PNF    Push downs      Wall push ups    Arm circles      SLS    Elbow flex/ext          Chin tuck      Stretching    UT shrugs/rolls      Gastroc/Soleus    Rocking horse      Hamstring          SKTC    Other      Piriformis    Dangle  10'  6 noodles     Hip flexor          Ladder pull          Pec stretch          Post deltoid           Treatment Included:  [] Therapeutic Exercise   [] Instruction in HEP  [] Group   [] Therapeutic Activity      [] Neuromuscular Re-education [x] Aquatic   [] Gait             [] Manual  Other:  Time In/ Time Out:  0487-0970    Timed Code Treatment Minutes:   46aqua    Total Treatment Minutes:   46

## 2023-08-29 ENCOUNTER — HOSPITAL ENCOUNTER (OUTPATIENT)
Dept: PHYSICAL THERAPY | Age: 58
Setting detail: THERAPIES SERIES
Discharge: HOME | End: 2023-08-29
Payer: COMMERCIAL

## 2023-08-29 PROCEDURE — 97113 AQUATIC THERAPY/EXERCISES: CPT
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
## 2023-08-31 ENCOUNTER — HOSPITAL ENCOUNTER (OUTPATIENT)
Dept: PHYSICAL THERAPY | Age: 58
Setting detail: THERAPIES SERIES
Discharge: HOME | End: 2023-08-31
Payer: COMMERCIAL

## 2023-08-31 PROCEDURE — 97113 AQUATIC THERAPY/EXERCISES: CPT

## 2023-08-31 NOTE — FLOWSHEET NOTE
Physical Therapy Aquatic Flow Sheet   Date:  2023    Patient Name:  Sarai Taylor    :  1965  Restrictions/Precautions:    Diagnosis:     Treatment Diagnosis:     Insurance/Certification information:    Referring Physician:     Any changes in Ambulatory Summary Sheet?:  Plan of care signed (Y/N):    Visit# / total visits: 8/  Pain level:  7/10 R ankle/hip/low back  L knee  Electronically signed by:  Pina Bocanegra PTA                      Subjective:  Patient reports of 7/10 pain upon arrival and reports he's just not having a good day.        Key  B= Belt DB= Dumbells T= Theratube   H= Hydrotone N= Noodles W= Weights   P= Paddles S= Speedo equipment K= Kickboard     Exercises/Activities   Warm-up/Amb    Exercises      Slow forward/backward  10'  focus on heel strike and toe off   HR/TR      Slow sideways  5'  Marches  2'    Slow backwards    Mini-squats  2'    Medium forward    3-way SLR  1' ea way B    Medium sideways    Hip circles/fig 8      Small shuffle    Hamstring curls      Jog    Knee extension      Braiding    Pelvic tilts      Bicycling    Scap squeezes          Shoulder flex/ext      Functional    Shoulder abd/add      Step    Shoulder H. abd/add      Lifting    Shoulder IR/ER      Hand to opp knee    Rowing      Push down squat    Bilateral pull down      UE PNF    Push/pull      LE PNF    Push downs      Wall push ups    Arm circles      SLS    Elbow flex/ext          Chin tuck      Stretching    UT shrugs/rolls      Gastroc/Soleus    Rocking horse      Hamstring          SKTC    Other      Piriformis    Dangle  10'  6 noodles     Hip flexor          Ladder pull          Pec stretch          Post deltoid           Treatment Included:  [] Therapeutic Exercise   [] Instruction in HEP  [] Group   [] Therapeutic Activity      [] Neuromuscular Re-education [x] Aquatic   [] Gait             [] Manual  Other:  Time In/ Time Out:   0388-0505    Timed Code Treatment Minutes:  97QLEY

## 2023-09-05 ENCOUNTER — HOSPITAL ENCOUNTER (OUTPATIENT)
Dept: PHYSICAL THERAPY | Age: 58
Setting detail: THERAPIES SERIES
Discharge: HOME | End: 2023-09-05
Payer: COMMERCIAL

## 2023-09-05 PROCEDURE — 97113 AQUATIC THERAPY/EXERCISES: CPT

## 2023-09-05 NOTE — FLOWSHEET NOTE
Physical Therapy Aquatic Flow Sheet   Date:  2023    Patient Name:  Dariusz Ramírez    :  1965  Restrictions/Precautions:    Diagnosis:     Treatment Diagnosis:     Insurance/Certification information:    Referring Physician:     Any changes in Ambulatory Summary Sheet?:  Plan of care signed (Y/N):    Visit# / total visits: 9/  Pain level: 6/10 R ankle/hip/low back  L knee  Electronically signed by:  Bert Kim PTA                      Subjective:  Patient reports of 610 pain upon arrival and reports he's just not having a good day.        Key  B= Belt DB= Dumbells T= Theratube   H= Hydrotone N= Noodles W= Weights   P= Paddles S= Speedo equipment K= Kickboard     Exercises/Activities   Warm-up/Amb    Exercises      Slow forward/ 10'  focus on heel strike and toe off   HR/TR      Slow sideways  5'  Marches  2'    Slow backwards    Mini-squats  2'    Medium forward    3-way SLR  1' ea way B    Medium sideways    Hip circles/fig 8      Small shuffle    Hamstring curls      Jog    Knee extension      Braiding    Pelvic tilts      Bicycling    Scap squeezes          Shoulder flex/ext      Functional    Shoulder abd/add      Step    Shoulder H. abd/add      Lifting    Shoulder IR/ER      Hand to opp knee    Rowing      Push down squat    Bilateral pull down      UE PNF    Push/pull      LE PNF    Push downs      Wall push ups    Arm circles      SLS    Elbow flex/ext          Chin tuck      Stretching    UT shrugs/rolls      Gastroc/Soleus    Rocking horse      Hamstring          SKTC    Other      Piriformis    Dangle  10'  6 noodles     Hip flexor          Ladder pull          Pec stretch          Post deltoid           Treatment Included:  [] Therapeutic Exercise   [] Instruction in HEP  [] Group   [] Therapeutic Activity      [] Neuromuscular Re-education [x] Aquatic   [] Gait             [] Manual  Other:  Time In/ Time Out:   3555-8305    Timed Code Treatment Minutes:  55 aqua     Total Treatment

## 2023-09-07 ENCOUNTER — HOSPITAL ENCOUNTER (OUTPATIENT)
Dept: PHYSICAL THERAPY | Age: 58
Setting detail: THERAPIES SERIES
Discharge: HOME | End: 2023-09-07
Payer: COMMERCIAL

## 2023-09-07 PROCEDURE — 97113 AQUATIC THERAPY/EXERCISES: CPT

## 2023-09-07 NOTE — FLOWSHEET NOTE
entered and exited the pool via steps/ Mod Oswestry 28/50 and Quick DASH 11/55    Communication with other providers:    Education to Patient:    Adverse Reaction to Treatment:    Assessment: Patient rated his pain 6/10 after treatment. Demonstrates good heel/toe gait pattern with walking.       Treatment/Activity Tolerance:  [x] Patient tolerated treatment well [] Patient limited by fatique  [] Patient limited by pain [] Patient limited by other medical complications  [] Other:     Prognosis: [] Good [] Fair  [] Poor    Patient Requires Follow-up: [] Yes  [] No    Plan:   [x] Continue per plan of care [] Alter current plan (see comments)  [] Plan of care initiated [] Hold pending MD visit [] Discharge    See Weekly Progress Note:    [] Yes [] No Next due:        Electronically signed by:  Savage Sanchez PT, 9/7/2023, 8:41 AM

## 2023-11-21 ENCOUNTER — HOSPITAL ENCOUNTER (OUTPATIENT)
Dept: PHYSICAL THERAPY | Age: 58
Setting detail: THERAPIES SERIES
Discharge: HOME | End: 2023-11-21
Payer: COMMERCIAL

## 2023-11-21 PROCEDURE — 97162 PT EVAL MOD COMPLEX 30 MIN: CPT

## 2023-11-21 PROCEDURE — 97110 THERAPEUTIC EXERCISES: CPT

## 2023-11-21 ASSESSMENT — PAIN SCALES - GENERAL: PAINLEVEL_OUTOF10: 5

## 2023-11-21 NOTE — PROGRESS NOTES
Physical Therapy: Initial Evaluation    Patient: Genoveva Mart (17 y.o. male)   Examination Date:   Plan of Care Certification Period: 2023 to        :  1965 ;    Confirmed: Yes MRN: [id number]  CSN: [id number]   Insurance: Payor: Zack Marsh / Plan: [employer] / Product Type: *No Product type* /   Insurance ID:  - (Worker's Comp) Secondary Insurance (if applicable):    Referring Physician: DO Dunia Luna   PCP: Timothy Zelaya[address] Visits to Date/Visits Approved:   /      No Show/Cancelled Appts:   /       Medical Diagnosis: Sprain of unspecified ligament of right ankle, initial encounter [S93.401A]  Strain of right Achilles tendon, initial encounter [S86.011A]  Displaced fracture of first metatarsal bone, right foot, initial encounter for closed fracture [S92.311A]  Peroneal tendinitis, right leg [M76.71] s/p R AT surgery  Treatment Diagnosis: R ankle stiffness     PERTINENT MEDICAL HISTORY   Patient Assessed for Rehabilitation Services: Yes       Medical History: Chart Reviewed: Yes   Past Medical History:   Diagnosis Date    Atrial fibrillation ([address])     Breast mass     Dyspnea     H/O echocardiogram 10/29/2019    EF 55-60%, Mild bilateral atrial and right ventricular dilatation.     History of echocardiogram 2018    Limited, EF 55-60%    History of stress test 2018    EF 32% with global hypokinesis pt was in afib during exam. needs invasive work up for further discussion    Hypercholesterolemia     Melena     Snoring      Surgical History:   Past Surgical History:   Procedure Laterality Date    ATRIAL ABLATION SURGERY N/A 2018    AFIB    BACK SURGERY      3- discectomy    CARPAL TUNNEL RELEASE Bilateral     Carpal tunnel bilatera    ELBOW SURGERY      bicep muscle tendon repair    LITHOTRIPSY         Medications:   Current Outpatient Medications:     aspirin 81 MG EC tablet, Take 1 tablet by mouth daily, Disp: , Rfl:
listed interventions and demonstrated adequate to good form with no reports of increased pain. Pt reported temporary  muscle strain and fatigue during today's session. .Pain complaints after session 5/10   Skilled PT interventions are intended to:  gradually increase  ankle ROM/ leg strength   which will enable patient  to return to PLOF  Patient agrees with established plan of care and assisted in the development of their  goals  Barriers to learning:none- no mental/cognitive barriers observed  Preferred learning style(s):   written- demonstration -practice  Preferred Language: English  Potential barriers to progress:none  The patient appears motivated to participate in PT and regain PLOF: yes     Plan of Care/Treatment to date:  [x] Therapeutic Exercise  [] Modalities:  [x] Therapeutic Activity     [] Ultrasound  [x] Electrical Stimulation  [x] Gait Training      [] Cervical Traction [] Lumbar Traction  [x] Neuromuscular Re-education    [] Cold/hotpack [] Iontophoresis   [x] Instruction in HEP      [x] Vasopneumatic    [] Dry Needling  [x] Manual Therapy               [x] Aquatic Therapy       Other:          Frequency/Duration:  # Days per week: [] 1 day # Weeks: [] 1 week [] 5 weeks     [x] 2 days   [] 2 weeks [x] 6 weeks     [] 3 days   [] 3 weeks [] 7 weeks     [] 4 days   [] 4 weeks [] 8 weeks         [] 9 weeks [] 10 weeks         [] 11 weeks [] 12 weeks    Rehab Potential/Progress: [] Excellent [] Good [] Fair  [] Poor     Goals:    Patient goals: return to work  Long Term Goals  Time Frame for Long Term Goals: 10 sessions  patient will score 20/80 on LEFS indicating imporved lower extremity function with ADL/IADL  patient will actively DF R foot to 0*   Electronically signed by:  Jack Garcia PT, 11/21/2023, 2:08 PM  If you have any questions or concerns, please don't hesitate to call.   Thank you for your referral.      Physician Signature:________________________________Date:_________ TIME: _____  By

## 2023-11-21 NOTE — FLOWSHEET NOTE
Outpatient Physical Therapy  [facility]           [x] Phone: [phone]   Fax: [phone]  [facility]           [] Phone: [phone]   Fax: [phone]        Physical Therapy Daily Treatment Note  Date:  2023    Patient Name:  Mandie Sampson    :  1965  MRN: [id number]  Restrictions/Precautions: Restrictions/Precautions: Weight Bearing     Lower Extremity Weight Bearing Restrictions  Right Lower Extremity Weight Bearing: Weight Bearing As Tolerated  Diagnosis:   Sprain of unspecified ligament of right ankle, initial encounter [S93.401A]  Strain of right Achilles tendon, initial encounter [S86.011A]  Displaced fracture of first metatarsal bone, right foot, initial encounter for closed fracture [S92.311A]  Peroneal tendinitis, right leg [M76.71] Diagnosis: s/p R AT surgery  Date of Injury/Surgery:   Treatment Diagnosis:  R ankle stiffness  Insurance/Certification information: [facility] 2 x wk  x10 visits  Referring Physician:  Erika Jama DO    PCP: ADA Bell  Next Doctor Visit:    Plan of care signed (Y/N):    Outcome Measure:   Visit# / total visits:  1 /10  Pain level: 5/10   Goals:     Patient goals: return to work  Long Term Goals  Time Frame for Long Term Goals: 10 sessions  patient will score 20/80 on LEFS indicating imporved lower extremity function with ADL/IADL  patient will actively DF R foot to 0*               Summary of Evaluation:      Patient primary complaints: R ankle stifness  History of condition:s/p R AT repair with FHL tendon transfer 10/19/23- no prior R ankle surgeries  Current functional limitations: ambulating with crutches/ WBAT - walker boot on R leg; off work  Clinical findings:Assessment: LEFS 6./80; ankle DF a AROM limited by pain/tightness  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  . Pt would continue

## 2023-11-22 NOTE — FLOWSHEET NOTE
Worker's comp extended the end date on Elia's C-9 to end on 1/19/2024. Updated C-9 was scanned and placed in his chart.

## 2023-11-27 ENCOUNTER — HOSPITAL ENCOUNTER (OUTPATIENT)
Dept: PHYSICAL THERAPY | Age: 58
Setting detail: THERAPIES SERIES
Discharge: HOME | End: 2023-11-27
Payer: COMMERCIAL

## 2023-11-27 PROCEDURE — 97110 THERAPEUTIC EXERCISES: CPT

## 2023-11-27 NOTE — FLOWSHEET NOTE
tolerated  treatment without any adverse reactions or complications this date. . Pt would continue to benefit from skilled therapy interventions to address remaining impairments, improve mobility and strength,  and progress toward goal completion and prepare for d/c including finalizing HEP ;  while reducing risk for re-injury or further decline  Pt performed  all listed interventions and demonstrated adequate to good form with no reports of increased pain. Pt reported temporary  muscle strain and fatigue during today's session. .Pain complaints after session 4.5/10       Plan for Next Session:        Time In / Time Out:     0802-0848       If [facility] Please Indicate Time In/Out/Total Time  CPT Code Time in Time out Total Time   PT eval         Ther  ex  0802 0848 46                                          Total for session      48       Timed Code/Total Treatment Minutes:         Next Progress Note due:        Plan of Care Interventions:  [x] Therapeutic Exercise  [] Modalities:  [x] Therapeutic Activity     [] Ultrasound  [x] Estim  [x] Gait Training      [] Cervical Traction [] Lumbar Traction  [x] Neuromuscular Re-education    [] Cold/hotpack [] Iontophoresis   [x] Instruction in HEP      [x] Vasopneumatic   [] Dry Needling    [x] Manual Therapy               [x] Aquatic Therapy              Electronically signed by:  Dennise Lozada PT 11/27/2023, 8:02 AM

## 2023-11-30 ENCOUNTER — HOSPITAL ENCOUNTER (OUTPATIENT)
Dept: PHYSICAL THERAPY | Age: 58
Setting detail: THERAPIES SERIES
Discharge: HOME | End: 2023-11-30
Payer: COMMERCIAL

## 2023-11-30 PROCEDURE — 97110 THERAPEUTIC EXERCISES: CPT

## 2023-11-30 NOTE — FLOWSHEET NOTE
Outpatient Physical Therapy  [facility]           [x] Phone: [phone]   Fax: [phone]  [facility]           [] Phone: [phone]   Fax: [phone]        Physical Therapy Daily Treatment Note  Date:  2023    Patient Name:  Lex Dacosta    :  1965  MRN: [id number]  Restrictions/Precautions: Restrictions/Precautions: Weight Bearing     Lower Extremity Weight Bearing Restrictions  Right Lower Extremity Weight Bearing: Weight Bearing As Tolerated  Diagnosis:   Sprain of unspecified ligament of right ankle, initial encounter [S93.401A]  Strain of right Achilles tendon, initial encounter [S86.011A]  Displaced fracture of first metatarsal bone, right foot, initial encounter for closed fracture [S92.311A]  Peroneal tendinitis, right leg [M76.71] Diagnosis: s/p R AT surgery  Date of Injury/Surgery:   Treatment Diagnosis:  R ankle stiffness  Insurance/Certification information: [facility] 2 x wk  x10 visitsWorker's comp extended the end date on Elia[facility] C-9 to end on 2024  Referring Physician:  Audrey Adam DO    PCP: ADA Soto  Next Doctor Visit:    Plan of care signed (Y/N):    Outcome Measure:   Visit# / total visits: 3/10  Pain level: 3/10 R foot   8-9/10 R hip  Goals:     Patient goals: return to work  Long Term Goals  Time Frame for Long Term Goals: 10 sessions  patient will score 20/80 on LEFS indicating imporved lower extremity function with ADL/IADL  patient will actively DF R foot to 0*               Summary of Evaluation:      Patient primary complaints: R ankle stifness  History of condition:s/p R AT repair with FHL tendon transfer 10/19/23- no prior R ankle surgeries  Current functional limitations: ambulating with crutches/ WBAT - walker boot on R leg; off work  Clinical findings:Assessment: LEFS 6./80; ankle DF a AROM limited by pain/tightness  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

## 2023-12-06 ENCOUNTER — HOSPITAL ENCOUNTER (OUTPATIENT)
Dept: PHYSICAL THERAPY | Age: 58
Setting detail: THERAPIES SERIES
Discharge: HOME | End: 2023-12-06
Payer: COMMERCIAL

## 2023-12-06 PROCEDURE — 97110 THERAPEUTIC EXERCISES: CPT

## 2023-12-06 NOTE — FLOWSHEET NOTE
Outpatient Physical Therapy  [facility]           [x] Phone: [phone]   Fax: [phone]  [facility]           [] Phone: [phone]   Fax: [phone]        Physical Therapy Daily Treatment Note  Date:  2023    Patient Name:  Prudence Engle    :  1965  MRN: [id number]  Restrictions/Precautions: Restrictions/Precautions: Weight Bearing     Lower Extremity Weight Bearing Restrictions  Right Lower Extremity Weight Bearing: Weight Bearing As Tolerated  Diagnosis:   Sprain of unspecified ligament of right ankle, initial encounter [S93.401A]  Strain of right Achilles tendon, initial encounter [S86.011A]  Displaced fracture of first metatarsal bone, right foot, initial encounter for closed fracture [S92.311A]  Peroneal tendinitis, right leg [M76.71] Diagnosis: s/p R AT surgery  Date of Injury/Surgery:   Treatment Diagnosis:  R ankle stiffness  Insurance/Certification information: [facility] 2 x wk  x10 visitsWorker's comp extended the end date on Elia's C-9 to end on 2024  Referring Physician:  Sowmya Hubbard DO    PCP: ADA Hua  Next Doctor Visit:    Plan of care signed (Y/N):    Outcome Measure:   Visit# / total visits: 4/10  Pain level: 3/10 R foot   7/10 R hip  Goals:     Patient goals: return to work  Long Term Goals  Time Frame for Long Term Goals: 10 sessions  patient will score 20/80 on LEFS indicating imporved lower extremity function with ADL/IADL  patient will actively DF R foot to 0*               Summary of Evaluation:      Patient primary complaints: R ankle stifness  History of condition:s/p R AT repair with FHL tendon transfer 10/19/23- no prior R ankle surgeries  Current functional limitations: ambulating with crutches/ WBAT - walker boot on R leg; off work  Clinical findings:Assessment: LEFS 6./80; ankle DF a AROM limited by pain/tightness  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

## 2023-12-08 ENCOUNTER — HOSPITAL ENCOUNTER (OUTPATIENT)
Dept: PHYSICAL THERAPY | Age: 58
Setting detail: THERAPIES SERIES
Discharge: HOME | End: 2023-12-08
Payer: COMMERCIAL

## 2023-12-08 PROCEDURE — 97110 THERAPEUTIC EXERCISES: CPT

## 2023-12-08 NOTE — FLOWSHEET NOTE
Outpatient Physical Therapy  [place]           [x] Phone: [phone]   Fax: [phone]  [facility]           [] Phone: [phone]   Fax: [phone]        Physical Therapy Daily Treatment Note  Date:  2023    Patient Name:  Joshua Miller    :  1965  MRN: [id number]  Restrictions/Precautions: Restrictions/Precautions: Weight Bearing     Lower Extremity Weight Bearing Restrictions  Right Lower Extremity Weight Bearing: Weight Bearing As Tolerated  Diagnosis:   Sprain of unspecified ligament of right ankle, initial encounter [S93.401A]  Strain of right Achilles tendon, initial encounter [S86.011A]  Displaced fracture of first metatarsal bone, right foot, initial encounter for closed fracture [S92.311A]  Peroneal tendinitis, right leg [M76.71] Diagnosis: s/p R AT surgery  Date of Injury/Surgery:   Treatment Diagnosis:  R ankle stiffness  Insurance/Certification information: [facility] 2 x wk  x10 visitsWorker's comp extended the end date on Elia's C-9 to end on 2024  Referring Physician:  Tami Messina DO    PCP: ADA Raines  Next Doctor Visit:    Plan of care signed (Y/N):    Outcome Measure:   Visit# / total visits: 5/10  Pain level: 2.5/10 R foot   710 R hip  Goals:     Patient goals: return to work  Long Term Goals  Time Frame for Long Term Goals: 10 sessions  patient will score 20/80 on LEFS indicating imporved lower extremity function with ADL/IADL  patient will actively DF R foot to 0*               Summary of Evaluation:      Patient primary complaints: R ankle stifness  History of condition:s/p R AT repair with FHL tendon transfer 10/19/23- no prior R ankle surgeries  Current functional limitations: ambulating with crutches/ WBAT - walker boot on R leg; off work  Clinical findings:Assessment: LEFS 6./80; ankle DF a AROM limited by pain/tightness  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

## 2023-12-11 ENCOUNTER — HOSPITAL ENCOUNTER (OUTPATIENT)
Dept: PHYSICAL THERAPY | Age: 58
Setting detail: THERAPIES SERIES
Discharge: HOME | End: 2023-12-11
Payer: COMMERCIAL

## 2023-12-11 PROCEDURE — 97110 THERAPEUTIC EXERCISES: CPT

## 2023-12-11 NOTE — FLOWSHEET NOTE
Manual Treatments: 1-5  MTP - glides/PROM;1st ray glides; scar massage      Modalities:        Communication with other providers:        Assessment:  (Response towards treatment session) (Pain Rating)  Pt tolerated  treatment without any adverse reactions or complications this date. . Pt would continue to benefit from skilled therapy interventions to address remaining impairments, improve mobility and strength,  and progress toward goal completion and prepare for d/c including finalizing HEP ;  while reducing risk for re-injury or further decline  Pt performed  all listed interventions and demonstrated adequate to good form with no reports of increased pain. Pt reported temporary  muscle strain and fatigue during today's session. .Pain complaints after session 2-3/10 R ankle   8/10 R hip      Plan for Next Session:        Time In / Time Out:     0803-0908       If [facility] Please Indicate Time In/Out/Total Time  CPT Code Time in Time out Total Time   PT eval         Ther  ex   0803 0908  65                                           Total for session      65       Timed Code/Total Treatment Minutes:         Next Progress Note due:        Plan of Care Interventions:  [x] Therapeutic Exercise  [] Modalities:  [x] Therapeutic Activity     [] Ultrasound  [x] Estim  [x] Gait Training      [] Cervical Traction [] Lumbar Traction  [x] Neuromuscular Re-education    [] Cold/hotpack [] Iontophoresis   [x] Instruction in HEP      [x] Vasopneumatic   [] Dry Needling    [x] Manual Therapy               [x] Aquatic Therapy              Electronically signed by:  Wilmer Avelar  12/11/2023, 8:03 AM

## 2023-12-15 ENCOUNTER — HOSPITAL ENCOUNTER (OUTPATIENT)
Dept: PHYSICAL THERAPY | Age: 58
Setting detail: THERAPIES SERIES
Discharge: HOME | End: 2023-12-15
Payer: COMMERCIAL

## 2023-12-15 PROCEDURE — 97110 THERAPEUTIC EXERCISES: CPT

## 2023-12-15 NOTE — PROGRESS NOTES
Outpatient Physical Therapy           [place]           [] Phone: [phone]   Fax: [phone]  [facility]           [x] Phone: [phone]   Fax: [phone]      To:J. Josphine Claude PA- C    From: Harlan Fowler PT, PT     Patient: Melchor Baig                    : 1965  Diagnosis:  Sprain of unspecified ligament of right ankle, initial encounter [S93.401A]  Strain of right Achilles tendon, initial encounter [S86.011A]  Displaced fracture of first metatarsal bone, right foot, initial encounter for closed fracture [S92.311A]  Peroneal tendinitis, right leg [M76.71]        Treatment Diagnosis:       Date: 12/15/2023  [x]  Progress Note                []  Discharge Note        PT has focused on scar mobility an limited ankle ROM activity. Will progress PT/mobility activity per your guidance. We do have in house aquatic therapy/ with a transfer chair for early  limited weight bearing gait training     Electronically signed by:  Harlan Fowler PT,  12/15/2023, 7:35 AM    If you have any questions or concerns, please don't hesitate to call.   Thank you for your referral.

## 2023-12-15 NOTE — FLOWSHEET NOTE
Outpatient Physical Therapy  [place]           [x] Phone: [phone]   Fax: [phone]  [facility]           [] Phone: [phone]   Fax: [phone]        Physical Therapy Daily Treatment Note  Date:  12/15/2023    Patient Name:  Navarro Lopez    :  1965  MRN: [id number]  Restrictions/Precautions: Restrictions/Precautions: Weight Bearing     Lower Extremity Weight Bearing Restrictions  Right Lower Extremity Weight Bearing: Weight Bearing As Tolerated  Diagnosis:   Sprain of unspecified ligament of right ankle, initial encounter [S93.401A]  Strain of right Achilles tendon, initial encounter [S86.011A]  Displaced fracture of first metatarsal bone, right foot, initial encounter for closed fracture [S92.311A]  Peroneal tendinitis, right leg [M76.71] Diagnosis: s/p R AT surgery  Date of Injury/Surgery:   Treatment Diagnosis:  R ankle stiffness  Insurance/Certification information: [facility] 2 x wk  x10 visitsWorker's comp extended the end date on Elia's C-9 to end on 2024  Referring Physician:  Alfa Jansen DO    PCP: ADA Gallego  Next Doctor Visit:    Plan of care signed (Y/N):    Outcome Measure:   Visit# / total visits: 7/10  Pain level: 2-3/10 R foot   8/10 R hip  Goals:     Patient goals: return to work  Long Term Goals  Time Frame for Long Term Goals: 10 sessions  patient will score 20/80 on LEFS indicating imporved lower extremity function with ADL/IADL  patient will actively DF R foot to 0*               Summary of Evaluation:      Patient primary complaints: R ankle stifness  History of condition:s/p R AT repair with FHL tendon transfer 10/19/23- no prior R ankle surgeries  Current functional limitations: ambulating with crutches/ WBAT - walker boot on R leg; off work  Clinical findings:Assessment: LEFS 6./80; ankle DF a AROM limited by pain/tightness  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

## 2023-12-27 ENCOUNTER — HOSPITAL ENCOUNTER (OUTPATIENT)
Dept: PHYSICAL THERAPY | Age: 58
Setting detail: THERAPIES SERIES
Discharge: HOME | End: 2023-12-27
Payer: COMMERCIAL

## 2023-12-27 PROCEDURE — 97110 THERAPEUTIC EXERCISES: CPT

## 2023-12-27 NOTE — FLOWSHEET NOTE
Outpatient Physical Therapy  [place]           [x] Phone: [phone]   Fax: [phone]  [facility]           [] Phone: [phone]   Fax: [phone]        Physical Therapy Daily Treatment Note  Date:  2023    Patient Name:  Betina Laguna    :  1965  MRN: [id number]  Restrictions/Precautions: Restrictions/Precautions: Weight Bearing     Lower Extremity Weight Bearing Restrictions  Right Lower Extremity Weight Bearing: Weight Bearing As Tolerated  Diagnosis:   Sprain of unspecified ligament of right ankle, initial encounter [S93.401A]  Strain of right Achilles tendon, initial encounter [S86.011A]  Displaced fracture of first metatarsal bone, right foot, initial encounter for closed fracture [S92.311A]  Peroneal tendinitis, right leg [M76.71] Diagnosis: s/p R AT surgery  Date of Injury/Surgery:   Treatment Diagnosis:  R ankle stiffness  Insurance/Certification information: [facility] 2 x wk  x10 visitsWorker's comp extended the end date on Elia's C-9 to end on 2024  Referring Physician:  Jennifer Spurling, DO    PCP: ADA Badillo  Next Doctor Visit:    Plan of care signed (Y/N):    Outcome Measure:   Visit# / total visits9/10  Pain level: 2-3/10 R foot  910 R hip/ back pain intense  Goals:     Patient goals: return to work  Long Term Goals  Time Frame for Long Term Goals: 10 sessions  patient will score 20/80 on LEFS indicating imporved lower extremity function with ADL/IADL  patient will actively DF R foot to 0*               Summary of Evaluation:      Patient primary complaints: R ankle stifness  History of condition:s/p R AT repair with FHL tendon transfer 10/19/23- no prior R ankle surgeries  Current functional limitations: ambulating with crutches/ WBAT - walker boot on R leg; off work  Clinical findings:Assessment: LEFS 6./80; ankle DF a AROM limited by pain/tightness  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

## 2023-12-28 NOTE — CARE COORDINATION
Received new C9 for 2 x week for 6 weeks, but not to start til 1/8/24  with end date of 2/16/24.     Scanned and placed in chart

## 2023-12-29 ENCOUNTER — HOSPITAL ENCOUNTER (OUTPATIENT)
Dept: PHYSICAL THERAPY | Age: 58
Setting detail: THERAPIES SERIES
Discharge: HOME | End: 2023-12-29
Payer: COMMERCIAL

## 2023-12-29 PROCEDURE — 97110 THERAPEUTIC EXERCISES: CPT

## 2023-12-29 NOTE — FLOWSHEET NOTE
Outpatient Physical Therapy  [place]           [x] Phone: [phone]   Fax: [phone]  [facility]           [] Phone: [phone]   Fax: [phone]        Physical Therapy Daily Treatment Note  Date:  2023    Patient Name:  Ahmet Burciaga    :  1965  MRN: [id number]  Restrictions/Precautions: Restrictions/Precautions: Weight Bearing     Lower Extremity Weight Bearing Restrictions  Right Lower Extremity Weight Bearing: Weight Bearing As Tolerated  Diagnosis:   Sprain of unspecified ligament of right ankle, initial encounter [S93.401A]  Strain of right Achilles tendon, initial encounter [S86.011A]  Displaced fracture of first metatarsal bone, right foot, initial encounter for closed fracture [S92.311A]  Peroneal tendinitis, right leg [M76.71] Diagnosis: s/p R AT surgery  Date of Injury/Surgery:   Treatment Diagnosis:  R ankle stiffness  Insurance/Certification information: [facility] 2 x wk  x10 visitsWorker's comp extended the end date on Elia's C-9 to end on 2024  Referring Physician:  Pau Hogan DO    PCP: ADA Patel  Next Doctor Visit:    Plan of care signed (Y/N):    Outcome Measure:   Visit# / total visits     Pain level: 2/10 R foot  10 R hip/ back pain intense  Goals:     Patient goals: return to work  Long Term Goals  Time Frame for Long Term Goals: 10 sessions  patient will score 20/80 on LEFS indicating imporved lower extremity function with ADL/IADL  patient will actively DF R foot to 0*               Summary of Evaluation:      Patient primary complaints: R ankle stifness  History of condition:s/p R AT repair with FHL tendon transfer 10/19/23- no prior R ankle surgeries  Current functional limitations: ambulating with crutches/ WBAT - walker boot on R leg; off work  Clinical findings:Assessment: LEFS 6./80; ankle DF a AROM limited by pain/tightness  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

## 2024-01-03 ENCOUNTER — HOSPITAL ENCOUNTER (OUTPATIENT)
Dept: PHYSICAL THERAPY | Age: 59
Setting detail: THERAPIES SERIES
Discharge: HOME | End: 2024-01-03
Payer: COMMERCIAL

## 2024-01-03 PROCEDURE — 97110 THERAPEUTIC EXERCISES: CPT

## 2024-01-03 NOTE — FLOWSHEET NOTE
Outpatient Physical Therapy  [place]           [x] Phone: [phone]   Fax: [phone]  [place]           [] Phone: [phone]   Fax: [phone]        Physical Therapy Daily Treatment Note  Date:  1/3/2024    Patient Name:  Elia Martinez    :  1965  MRN: [id number]  Restrictions/Precautions: Restrictions/Precautions: Weight Bearing     Lower Extremity Weight Bearing Restrictions  Right Lower Extremity Weight Bearing: Weight Bearing As Tolerated  Diagnosis:   Sprain of unspecified ligament of right ankle, initial encounter [S93.401A]  Strain of right Achilles tendon, initial encounter [S86.011A]  Displaced fracture of first metatarsal bone, right foot, initial encounter for closed fracture [S92.311A]  Peroneal tendinitis, right leg [M76.71] Diagnosis: s/p R AT surgery  Date of Injury/Surgery:   Treatment Diagnosis:  R ankle stiffness  Insurance/Certification information: [facility]  x10 visitsWorker's comp extended the end date on Elia's C-9 to end on 2024  Referring Physician:  Ross Duque DO    PCP: Oneida Adames PA  Next Doctor Visit:    Plan of care signed (Y/N):    Outcome Measure:   Visit# / total visits     Pain level: 2/10 R foot  10 R hip/ back pain intense  Goals:     Patient goals: return to work  Long Term Goals  Time Frame for Long Term Goals: 10 sessions  patient will score 20/80 on LEFS indicating imporved lower extremity function with ADL/IADL  patient will actively DF R foot to 0*               Summary of Evaluation:      Patient primary complaints: R ankle stifness  History of condition:s/p R AT repair with FHL tendon transfer 10/19/23- no prior R ankle surgeries  Current functional limitations: ambulating with crutches/ WBAT - walker boot on R leg; off work  Clinical findings:Assessment: LEFS 6./80; ankle DF a AROM limited by pain/tightness  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

## 2024-01-08 RX ORDER — AMLODIPINE BESYLATE 10 MG/1
10 TABLET ORAL DAILY
Qty: 90 TABLET | Refills: 3 | Status: SHIPPED | OUTPATIENT
Start: 2024-01-08

## 2024-01-08 RX ORDER — HYDROCHLOROTHIAZIDE 25 MG/1
25 TABLET ORAL EVERY MORNING
Qty: 90 TABLET | Refills: 3 | Status: SHIPPED | OUTPATIENT
Start: 2024-01-08

## 2024-01-10 ENCOUNTER — HOSPITAL ENCOUNTER (OUTPATIENT)
Dept: PHYSICAL THERAPY | Age: 59
Setting detail: THERAPIES SERIES
Discharge: HOME | End: 2024-01-10
Payer: COMMERCIAL

## 2024-01-10 PROCEDURE — 97110 THERAPEUTIC EXERCISES: CPT

## 2024-01-10 NOTE — FLOWSHEET NOTE
Outpatient Physical Therapy  [place]           [x] Phone: [phone]   Fax: [phone]  [place]           [] Phone: [phone]   Fax: [phone]        Physical Therapy Daily Treatment Note  Date:  1/10/2024    Patient Name:  Elia Martinez    :  1965  MRN: [id number]  Restrictions/Precautions: Restrictions/Precautions: Weight Bearing     Lower Extremity Weight Bearing Restrictions  Right Lower Extremity Weight Bearing: Weight Bearing As Tolerated  Diagnosis:   Sprain of unspecified ligament of right ankle, initial encounter [S93.401A]  Strain of right Achilles tendon, initial encounter [S86.011A]  Displaced fracture of first metatarsal bone, right foot, initial encounter for closed fracture [S92.311A]  Peroneal tendinitis, right leg [M76.71] Diagnosis: s/p R AT surgery  Date of Injury/Surgery:   Treatment Diagnosis:  R ankle stiffness  Insurance/Certification information: [facility] 2 x wk  x10 visitsWorker's comp extended the end date on Elia's C-9 to end on 2024  Referring Physician:  Ross Duque DO    PCP: Oneida Adames PA  Next Doctor Visit:    Plan of care signed (Y/N):    Outcome Measure:   Visit# / total visits     Pain level: 2/10 R foot  9/10 R hip/ back pain intense  Goals:     Patient goals: return to work  Long Term Goals  Time Frame for Long Term Goals: 10 sessions  patient will score 20/80 on LEFS indicating imporved lower extremity function with ADL/IADL  patient will actively DF R foot to 0*               Summary of Evaluation:      Patient primary complaints: R ankle stifness  History of condition:s/p R AT repair with FHL tendon transfer 10/19/23- no prior R ankle surgeries  Current functional limitations: ambulating with crutches/ WBAT - walker boot on R leg; off work  Clinical findings:Assessment: LEFS 6./80; ankle DF a AROM limited by pain/tightness  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

## 2024-01-12 ENCOUNTER — HOSPITAL ENCOUNTER (OUTPATIENT)
Dept: PHYSICAL THERAPY | Age: 59
Setting detail: THERAPIES SERIES
Discharge: HOME | End: 2024-01-12
Payer: COMMERCIAL

## 2024-01-12 PROCEDURE — 97110 THERAPEUTIC EXERCISES: CPT

## 2024-01-12 NOTE — FLOWSHEET NOTE
Outpatient Physical Therapy  [place]           [x] Phone: [phone]   Fax: [phone]  [place]           [] Phone: [phone]   Fax: [phone]        Physical Therapy Daily Treatment Note  Date:  2024    Patient Name:  Elia Martinez    :  1965  MRN: [id number]  Restrictions/Precautions: Restrictions/Precautions: Weight Bearing     Lower Extremity Weight Bearing Restrictions  Right Lower Extremity Weight Bearing: Weight Bearing As Tolerated  Diagnosis:   Sprain of unspecified ligament of right ankle, initial encounter [S93.401A]  Strain of right Achilles tendon, initial encounter [S86.011A]  Displaced fracture of first metatarsal bone, right foot, initial encounter for closed fracture [S92.311A]  Peroneal tendinitis, right leg [M76.71] Diagnosis: s/p R AT surgery  Date of Injury/Surgery:   Treatment Diagnosis:  R ankle stiffness  Insurance/Certification information: [facility] 2 x wk  x10 visitsWorker's comp extended the end date on Elia's C-9 to end on 2024  Referring Physician:  Ross Duque DO    PCP: Oneida Adames PA  Next Doctor Visit:    Plan of care signed (Y/N):    Outcome Measure:   Visit# / total visits     Pain level: 2/10 R foot  910 R hip/ back pain intense  Goals:     Patient goals: return to work  Long Term Goals  Time Frame for Long Term Goals: 10 sessions  patient will score 20/80 on LEFS indicating imporved lower extremity function with ADL/IADL  patient will actively DF R foot to 0*               Summary of Evaluation:      Patient primary complaints: R ankle stifness  History of condition:s/p R AT repair with FHL tendon transfer 10/19/23- no prior R ankle surgeries  Current functional limitations: ambulating with crutches/ WBAT - walker boot on R leg; off work  Clinical findings:Assessment: LEFS 6./80; ankle DF a AROM limited by pain/tightness  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

## 2024-01-15 ENCOUNTER — HOSPITAL ENCOUNTER (OUTPATIENT)
Dept: PHYSICAL THERAPY | Age: 59
Setting detail: THERAPIES SERIES
Discharge: HOME | End: 2024-01-15
Payer: COMMERCIAL

## 2024-01-15 PROCEDURE — 97140 MANUAL THERAPY 1/> REGIONS: CPT

## 2024-01-15 PROCEDURE — 97110 THERAPEUTIC EXERCISES: CPT

## 2024-01-15 NOTE — FLOWSHEET NOTE
PROPRIOCEPTION                                    MODALITIES                      Other Therapeutic Activities/Education:        Home Exercise Program:        Manual Treatments: 1-5  MTP - glides/PROM;1st ray glides; scar massage      Modalities:        Communication with other providers:        Assessment:  (Response towards treatment session) (Pain Rating) Rated his ankle pain 1-2/10 and hip and back 8-9/10.    Pt tolerated  treatment without any adverse reactions or complications this date. . Pt would continue to benefit from skilled therapy interventions to address remaining impairments, improve mobility and strength,  and progress toward goal completion and prepare for d/c including finalizing HEP ;  while reducing risk for re-injury or further decline.Progressed strengthening and functional activities this visit by initiating weaning from boot with no adverse reactions noted throughout session.  Pt performed  all listed interventions and demonstrated adequate to good form with no reports of increased pain.  Pt reported temporary  muscle strain and fatigue during today's session..Pain complaints after session 1-2/10 R foot   8-9/10 R hip      Plan for Next Session:        Time In / Time Out:  1126/1215     If [facility] Please Indicate Time In/Out/Total Time  CPT Code Time in Time out Total Time   PT eval         Ther  ex  1126 1200 34   Manual   1200  1215  15                                 Total for session             Timed Code/Total Treatment Minutes:   49  2 TE, 1 MT      Next Progress Note due:        Plan of Care Interventions:  [x] Therapeutic Exercise  [] Modalities:  [x] Therapeutic Activity     [] Ultrasound  [x] Estim  [x] Gait Training      [] Cervical Traction [] Lumbar Traction  [x] Neuromuscular Re-education    [] Cold/hotpack [] Iontophoresis   [x] Instruction in HEP      [x] Vasopneumatic   [] Dry Needling    [x] Manual Therapy               [x] Aquatic Therapy

## 2024-01-18 ENCOUNTER — HOSPITAL ENCOUNTER (OUTPATIENT)
Dept: PHYSICAL THERAPY | Age: 59
Setting detail: THERAPIES SERIES
Discharge: HOME | End: 2024-01-18
Payer: COMMERCIAL

## 2024-01-18 ENCOUNTER — OFFICE VISIT (OUTPATIENT)
Dept: CARDIOLOGY CLINIC | Age: 59
End: 2024-01-18

## 2024-01-18 VITALS — OXYGEN SATURATION: 96 % | DIASTOLIC BLOOD PRESSURE: 86 MMHG | HEART RATE: 104 BPM | SYSTOLIC BLOOD PRESSURE: 124 MMHG

## 2024-01-18 DIAGNOSIS — I48.0 PAROXYSMAL ATRIAL FIBRILLATION (HCC): Primary | ICD-10-CM

## 2024-01-18 DIAGNOSIS — E78.5 DYSLIPIDEMIA: ICD-10-CM

## 2024-01-18 DIAGNOSIS — G47.10 HYPERSOMNOLENCE: ICD-10-CM

## 2024-01-18 DIAGNOSIS — Z86.79 S/P ABLATION OF ATRIAL FIBRILLATION: ICD-10-CM

## 2024-01-18 DIAGNOSIS — I10 PRIMARY HYPERTENSION: ICD-10-CM

## 2024-01-18 DIAGNOSIS — Z98.890 S/P ABLATION OF ATRIAL FIBRILLATION: ICD-10-CM

## 2024-01-18 DIAGNOSIS — R07.2 PRECORDIAL PAIN: ICD-10-CM

## 2024-01-18 PROCEDURE — 97110 THERAPEUTIC EXERCISES: CPT

## 2024-01-18 PROCEDURE — 97140 MANUAL THERAPY 1/> REGIONS: CPT

## 2024-01-18 NOTE — FLOWSHEET NOTE
Outpatient Physical Therapy  [place]           [x] Phone: [phone]   Fax: [phone]  [place]           [] Phone: [phone]   Fax: [phone]        Physical Therapy Daily Treatment Note  Date:  2024    Patient Name:  Elia Martinez    :  1965  MRN: [id number]  Restrictions/Precautions: Restrictions/Precautions: Weight Bearing     Lower Extremity Weight Bearing Restrictions  Right Lower Extremity Weight Bearing: Weight Bearing As Tolerated  Diagnosis:   Sprain of unspecified ligament of right ankle, initial encounter [S93.401A]  Strain of right Achilles tendon, initial encounter [S86.011A]  Displaced fracture of first metatarsal bone, right foot, initial encounter for closed fracture [S92.311A]  Peroneal tendinitis, right leg [M76.71] Diagnosis: s/p R AT surgery  Date of Injury/Surgery:   Treatment Diagnosis:  R ankle stiffness  Insurance/Certification information: [facility] 2 x wk  x10 visitsWorker's comp extended the end date on Elia's C-9 to end on 2024  Referring Physician:  Ross Duque DO    PCP: Oneida Adames PA  Next Doctor Visit:    Plan of care signed (Y/N):    Outcome Measure:   Visit# / total visits     Pain level: 1-2/10 R foot  9/10 R hip/ back pain intense  Goals:     Patient goals: return to work  Long Term Goals  Time Frame for Long Term Goals: 10 sessions  patient will score 20/80 on LEFS indicating imporved lower extremity function with ADL/IADL  patient will actively DF R foot to 0*               Summary of Evaluation:      Patient primary complaints: R ankle stifness  History of condition:s/p R AT repair with FHL tendon transfer 10/19/23- no prior R ankle surgeries  Current functional limitations: ambulating with crutches/ WBAT - walker boot on R leg; off work  Clinical findings:Assessment: LEFS 6./80; ankle DF a AROM limited by pain/tightness  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

## 2024-01-18 NOTE — PROGRESS NOTES
dilatation.   Doppler evaluation reveals mild aortic, mitral, tricuspid regurgitation.   No evidence of pericardial effusion.    stress test 1/8/18  Summary    A.Fib may affect EF results adversely    Supervising physician Dr. Medina .    non specific T wave inversion in lat leads but negative for ischemia by    diagnostic criteria    Abnormal reduced EF 32 % with global hypokinesis, patient was in afib during    exam    Moderate intensity medium size lat wall reversible defect in stress images    Fixed inferior defect consisted with diaphragmatic artifact    dilated left ventricle   Cath 3/14/18  Procedure Summary   Radial Access, Normal coronaries.LVEDp was 18 mmHG   No significant CAD, Right dominant system   Pt noted to be in afib      Angiographic Findings    Diagnostic Findings    Cardiac Arteries and Lesion Findings     LMCA: Normal and No Angiographicalyl Significant Disease.  LAD: Normal (no stenosis %) and No Angiographicalyl Significant Disease.Type  III vessel , 2 large diagonal are widely patent  LCx: widely patent  RCA: Normal (no stenosis %) and No Angiographicalyl Significant Disease.gives  PDA , it is widely patent , right dominant vessel, large calibre vessel    Echo 11/28/18  Summary   This is a limited echo to assess ejection fraction and regional wall motion.   Left ventricular systolic function is normal. Ejection fraction of 55-60%.   EF has improved compared to previous echo in Jan 2018   Normal pulmonary artery pressure.       All labs, medications and tests reviewed by myself including data and history from outside source , patient and available family .  Assessment & Plan:      1. Paroxysmal atrial fibrillation (HCC)    2. Primary hypertension    3. Dyslipidemia    4. S/P ablation of atrial fibrillation    5. Hypersomnolence    6. Precordial pain         Non Ischemic cardiomyopathy  Probably had tachycardia induced cardiomyopathy in setting of uncontrolled A. fib no significant CAD   Non

## 2024-01-22 ENCOUNTER — HOSPITAL ENCOUNTER (OUTPATIENT)
Dept: PHYSICAL THERAPY | Age: 59
Setting detail: THERAPIES SERIES
Discharge: HOME | End: 2024-01-22
Payer: COMMERCIAL

## 2024-01-22 PROCEDURE — 97140 MANUAL THERAPY 1/> REGIONS: CPT

## 2024-01-22 PROCEDURE — 97110 THERAPEUTIC EXERCISES: CPT

## 2024-01-22 NOTE — FLOWSHEET NOTE
Outpatient Physical Therapy  [place]           [x] Phone: [phone]   Fax: [phone]  [place]           [] Phone: [phone]   Fax: [phone]        Physical Therapy Daily Treatment Note  Date:  2024    Patient Name:  Elia Martinez    :  1965  MRN: [id number]  Restrictions/Precautions: Restrictions/Precautions: Weight Bearing     Lower Extremity Weight Bearing Restrictions  Right Lower Extremity Weight Bearing: Weight Bearing As Tolerated  Diagnosis:   Sprain of unspecified ligament of right ankle, initial encounter [S93.401A]  Strain of right Achilles tendon, initial encounter [S86.011A]  Displaced fracture of first metatarsal bone, right foot, initial encounter for closed fracture [S92.311A]  Peroneal tendinitis, right leg [M76.71] Diagnosis: s/p R AT surgery  Date of Injury/Surgery:   Treatment Diagnosis:  R ankle stiffness  Insurance/Certification information: [facility] 2 x wk  x10 visitsWorker's comp extended the end date on Elia's C-9 to end on 2024  Referring Physician:  Ross Duque DO    PCP: Oneida Adames PA  Next Doctor Visit:    Plan of care signed (Y/N):    Outcome Measure:   Visit# / total visits     Pain level: 1/10 R foot  8+/10 R hip/ back pain intense  Goals:     Patient goals: return to work  Long Term Goals  Time Frame for Long Term Goals: 10 sessions  patient will score 20/80 on LEFS indicating imporved lower extremity function with ADL/IADL  patient will actively DF R foot to 0*               Summary of Evaluation:      Patient primary complaints: R ankle stifness  History of condition:s/p R AT repair with FHL tendon transfer 10/19/23- no prior R ankle surgeries  Current functional limitations: ambulating with crutches/ WBAT - walker boot on R leg; off work  Clinical findings:Assessment: LEFS 6./80; ankle DF a AROM limited by pain/tightness  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

## 2024-01-25 ENCOUNTER — HOSPITAL ENCOUNTER (OUTPATIENT)
Dept: PHYSICAL THERAPY | Age: 59
Setting detail: THERAPIES SERIES
Discharge: HOME | End: 2024-01-25
Payer: COMMERCIAL

## 2024-01-25 PROCEDURE — 97140 MANUAL THERAPY 1/> REGIONS: CPT

## 2024-01-25 PROCEDURE — 97110 THERAPEUTIC EXERCISES: CPT

## 2024-01-25 NOTE — FLOWSHEET NOTE
Outpatient Physical Therapy  [place]           [x] Phone: [phone]   Fax: [phone]  [place]           [] Phone: [phone]   Fax: [phone]        Physical Therapy Daily Treatment Note  Date:  2024    Patient Name:  Elia Martinez    :  1965  MRN: [id number]  Restrictions/Precautions: Restrictions/Precautions: Weight Bearing     Lower Extremity Weight Bearing Restrictions  Right Lower Extremity Weight Bearing: Weight Bearing As Tolerated  Diagnosis:   Sprain of unspecified ligament of right ankle, initial encounter [S93.401A]  Strain of right Achilles tendon, initial encounter [S86.011A]  Displaced fracture of first metatarsal bone, right foot, initial encounter for closed fracture [S92.311A]  Peroneal tendinitis, right leg [M76.71] Diagnosis: s/p R AT surgery  Date of Injury/Surgery:   Treatment Diagnosis:  R ankle stiffness  Insurance/Certification information: [facility] 2 x wk  x10 visitsWorker's comp extended the end date on Elia's C-9 to end on 2024  Referring Physician:  Ross Duque DO    PCP: Oneida Adames PA  Next Doctor Visit:    Plan of care signed (Y/N):    Outcome Measure:   Visit# / total visits     Pain level: 1-2/10 R foot  8-9/10 R hip/ back pain intense  Goals:     Patient goals: return to work  Long Term Goals  Time Frame for Long Term Goals: 10 sessions  patient will score 20/80 on LEFS indicating imporved lower extremity function with ADL/IADL  patient will actively DF R foot to 0*               Summary of Evaluation:      Patient primary complaints: R ankle stifness  History of condition:s/p R AT repair with FHL tendon transfer 10/19/23- no prior R ankle surgeries  Current functional limitations: ambulating with crutches/ WBAT - walker boot on R leg; off work  Clinical findings:Assessment: LEFS 6./80; ankle DF a AROM limited by pain/tightness  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

## 2024-01-29 ENCOUNTER — HOSPITAL ENCOUNTER (OUTPATIENT)
Dept: PHYSICAL THERAPY | Age: 59
Setting detail: THERAPIES SERIES
Discharge: HOME | End: 2024-01-29
Payer: COMMERCIAL

## 2024-01-29 PROCEDURE — 97110 THERAPEUTIC EXERCISES: CPT

## 2024-01-29 PROCEDURE — 97140 MANUAL THERAPY 1/> REGIONS: CPT

## 2024-01-29 NOTE — FLOWSHEET NOTE
Outpatient Physical Therapy  [place]           [x] Phone: [phone]   Fax: [phone]  [place]           [] Phone: [phone]   Fax: [phone]        Physical Therapy Daily Treatment Note  Date:  2024    Patient Name:  Elia Martinez    :  1965  MRN: [id number]  Restrictions/Precautions: Restrictions/Precautions: Weight Bearing     Lower Extremity Weight Bearing Restrictions  Right Lower Extremity Weight Bearing: Weight Bearing As Tolerated  Diagnosis:   Sprain of unspecified ligament of right ankle, initial encounter [S93.401A]  Strain of right Achilles tendon, initial encounter [S86.011A]  Displaced fracture of first metatarsal bone, right foot, initial encounter for closed fracture [S92.311A]  Peroneal tendinitis, right leg [M76.71] Diagnosis: s/p R AT surgery  Date of Injury/Surgery:   Treatment Diagnosis:  R ankle stiffness  Insurance/Certification information: [facility] 2 x wk  x10 visitsWorker's comp extended the end date on Elia's C-9 to end on 2024  Referring Physician:  Ross Duque DO    PCP: Oneida Adames PA  Next Doctor Visit:    Plan of care signed (Y/N):    Outcome Measure:   Visit# / total visits     Pain level: 2/10 R foot  8-9/10 R hip/ back pain intense  Goals:     Patient goals: return to work  Long Term Goals  Time Frame for Long Term Goals: 10 sessions  patient will score 20/80 on LEFS indicating imporved lower extremity function with ADL/IADL  patient will actively DF R foot to 0*               Summary of Evaluation:      Patient primary complaints: R ankle stifness  History of condition:s/p R AT repair with FHL tendon transfer 10/19/23- no prior R ankle surgeries  Current functional limitations: ambulating with crutches/ WBAT - walker boot on R leg; off work  Clinical findings:Assessment: LEFS 6./80; ankle DF a AROM limited by pain/tightness  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

## 2024-02-01 ENCOUNTER — HOSPITAL ENCOUNTER (OUTPATIENT)
Dept: PHYSICAL THERAPY | Age: 59
Setting detail: THERAPIES SERIES
Discharge: HOME | End: 2024-02-01
Payer: COMMERCIAL

## 2024-02-01 ENCOUNTER — TELEPHONE (OUTPATIENT)
Dept: CARDIOLOGY CLINIC | Age: 59
End: 2024-02-01

## 2024-02-01 PROCEDURE — 97140 MANUAL THERAPY 1/> REGIONS: CPT

## 2024-02-01 PROCEDURE — 97110 THERAPEUTIC EXERCISES: CPT

## 2024-02-01 NOTE — FLOWSHEET NOTE
treatment:Pt tolerated  treatment without any adverse reactions or complications this date. . Pt would continue to benefit from skilled therapy interventions to address remaining impairments, improve mobility and strength,  and progress toward goal completion and prepare for d/c including finalizing HEP ;  while reducing risk for re-injury or further decline  Pt performed  all listed interventions and demonstrated adequate to good form with no reports of increased pain.  Pt reported temporary  muscle strain and fatigue during today's session..Pain complaints after session 5/10   Skilled PT interventions are intended to:  gradually increase  ankle ROM/ leg strength   which will enable patient  to return to PLOF  Patient agrees with established plan of care and assisted in the development of their  goals  Barriers to learning:none- no mental/cognitive barriers observed  Preferred learning style(s):   written- demonstration -practice  Preferred Language: English  Potential barriers to progress:none  The patient appears motivated to participate in PT and regain PLOF: yes     Subjective:   Patient states that his foot is feeling good, 1-2/10.  States that his foot is feeling better every day.  Increased pain in the hip.        Any changes in Ambulatory Summary Sheet?  None        Objective:  Tightness noted in plantar fascia.    Exercises: (No more than 4 columns)   Exercise/Equipment 1/25/24 1/29/2024 2/1/2024           WARM UP      Nustep     S15/A13  Lv4 7'  in shoe S15/A13  Lv4 7'  in shoe S15/A13  Lv4 10'  in shoe         TABLE      Seated rocker board 5 mins   Lv3 5 min Lev 3 5 min Lev 3   DF AROM 4' 4 min  4 min    ABC's      Tennis ball big toe ext stretch      TB PF GTB 4' GTB 4' GTB 4 min                   STANDING      Step outs            Hurdles                                    PROPRIOCEPTION                                    MODALITIES                      Other Therapeutic Activities/Education:

## 2024-02-01 NOTE — TELEPHONE ENCOUNTER
Patient would like a note stating he has HTN and high HR due to pain in back hip foot and knee. He has a hearing next week an needs a note.

## 2024-02-05 ENCOUNTER — HOSPITAL ENCOUNTER (OUTPATIENT)
Dept: PHYSICAL THERAPY | Age: 59
Setting detail: THERAPIES SERIES
Discharge: HOME | End: 2024-02-05
Payer: COMMERCIAL

## 2024-02-05 PROCEDURE — 97110 THERAPEUTIC EXERCISES: CPT

## 2024-02-05 PROCEDURE — 97140 MANUAL THERAPY 1/> REGIONS: CPT

## 2024-02-05 NOTE — FLOWSHEET NOTE
Outpatient Physical Therapy  [place]           [x] Phone: [phone]   Fax: [phone]  [place]           [] Phone: [phone]   Fax: [phone]        Physical Therapy Daily Treatment Note  Date:  2024    Patient Name:  Elia Martinez    :  1965  MRN: [id number]  Restrictions/Precautions: Restrictions/Precautions: Weight Bearing     Lower Extremity Weight Bearing Restrictions  Right Lower Extremity Weight Bearing: Weight Bearing As Tolerated  Diagnosis:   Sprain of unspecified ligament of right ankle, initial encounter [S93.401A]  Strain of right Achilles tendon, initial encounter [S86.011A]  Displaced fracture of first metatarsal bone, right foot, initial encounter for closed fracture [S92.311A]  Peroneal tendinitis, right leg [M76.71] Diagnosis: s/p R AT surgery  Date of Injury/Surgery:   Treatment Diagnosis:  R ankle stiffness  Insurance/Certification information: [facility] 2 x wk  x10 visitsWorker's comp extended the end date on Elia's C-9 to end on 2024  Referring Physician:  Ross Duque DO    PCP: Oneida Adames PA  Next Doctor Visit:    Plan of care signed (Y/N):    Outcome Measure:   Visit# / total visits     Pain level: 1-2/10 R foot  8-9/10 R hip/ back pain intense  Goals:     Patient goals: return to work  Long Term Goals  Time Frame for Long Term Goals: 10 sessions  patient will score 20/80 on LEFS indicating imporved lower extremity function with ADL/IADL  patient will actively DF R foot to 0*               Summary of Evaluation:      Patient primary complaints: R ankle stifness  History of condition:s/p R AT repair with FHL tendon transfer 10/19/23- no prior R ankle surgeries  Current functional limitations: ambulating with crutches/ WBAT - walker boot on R leg; off work  Clinical findings:Assessment: LEFS 6./80; ankle DF a AROM limited by pain/tightness  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

## 2024-02-09 ENCOUNTER — HOSPITAL ENCOUNTER (OUTPATIENT)
Dept: PHYSICAL THERAPY | Age: 59
Setting detail: THERAPIES SERIES
Discharge: HOME | End: 2024-02-09
Payer: COMMERCIAL

## 2024-02-09 PROCEDURE — 97110 THERAPEUTIC EXERCISES: CPT

## 2024-02-09 PROCEDURE — 97140 MANUAL THERAPY 1/> REGIONS: CPT

## 2024-02-09 NOTE — FLOWSHEET NOTE
Activities/Education:        Home Exercise Program:        Manual Treatments: The patient received  explanation for the benefits and rational in utilizing the Graston Technique for their soft tissues limitations. Patient does not have any Red flags or absolute contraindications. Patient was instructed the use of the Graston Instruments on the skin may cause some discomfort/pain [place] of the skin, bruising or Petechiae. Patient understands these risks and has agreed to continue with the intervention.      GT was applied to the  left PF because the assessment demonstrated increased tightness and tenderness .The appropriate GT tool/tools and which stroke technique utilized were determined based on the assessment and treatment goals.   The patients skin at the end of the treatment showed no negative reaction.      Modalities:        Communication with other providers:        Assessment:  (Response towards treatment session) (Pain Rating)    Plan for Next Session:        Time In / Time Out:  0821/0900     If BWC Please Indicate Time In/Out/Total Time  CPT Code Time in Time out Total Time   PT eval         Ther  ex  915 940 25   Manual   940 950 10                             Total for session    35       Timed Code/Total Treatment Minutes:        Next Progress Note due:        Plan of Care Interventions:  [x] Therapeutic Exercise  [] Modalities:  [x] Therapeutic Activity     [] Ultrasound  [x] Estim  [x] Gait Training      [] Cervical Traction [] Lumbar Traction  [x] Neuromuscular Re-education    [] Cold/hotpack [] Iontophoresis   [x] Instruction in HEP      [x] Vasopneumatic   [] Dry Needling    [x] Manual Therapy               [x] Aquatic Therapy              Electronically signed by:  SANGITA Becerril PT,   2/9/2024, 9:14 AM

## 2024-02-13 ENCOUNTER — HOSPITAL ENCOUNTER (OUTPATIENT)
Dept: PHYSICAL THERAPY | Age: 59
Setting detail: THERAPIES SERIES
Discharge: HOME | End: 2024-02-13
Payer: COMMERCIAL

## 2024-02-13 PROCEDURE — 97110 THERAPEUTIC EXERCISES: CPT

## 2024-02-13 PROCEDURE — 97140 MANUAL THERAPY 1/> REGIONS: CPT

## 2024-02-13 NOTE — FLOWSHEET NOTE
Outpatient Physical Therapy  [place]           [x] Phone: [phone]   Fax: [phone]  [place]           [] Phone: [phone]   Fax: [phone]        Physical Therapy Daily Treatment Note  Date:  2024    Patient Name:  Elia Martinez    :  1965  MRN: [id number]  Restrictions/Precautions: Restrictions/Precautions: Weight Bearing     Lower Extremity Weight Bearing Restrictions  Right Lower Extremity Weight Bearing: Weight Bearing As Tolerated  Diagnosis:   Sprain of unspecified ligament of right ankle, initial encounter [S93.401A]  Strain of right Achilles tendon, initial encounter [S86.011A]  Displaced fracture of first metatarsal bone, right foot, initial encounter for closed fracture [S92.311A]  Peroneal tendinitis, right leg [M76.71] Diagnosis: s/p R AT surgery  Date of Injury/Surgery:   Treatment Diagnosis:  R ankle stiffness  Insurance/Certification information: [facility] 2 x wk  x10 visitsWorker's comp extended the end date on Elia's C-9 to end on 2024  Referring Physician:  Ross Duque DO    PCP: Oneida Adames PA  Next Doctor Visit:    Plan of care signed (Y/N):    Outcome Measure:   Visit# / total visits     Pain level: 1-2/10 R foot  8-9/10 R hip/ back pain intense  Goals:     Patient goals: return to work  Long Term Goals  Time Frame for Long Term Goals: 10 sessions  patient will score 20/80 on LEFS indicating imporved lower extremity function with ADL/IADL  patient will actively DF R foot to 0*               Summary of Evaluation:      Patient primary complaints: R ankle stifness  History of condition:s/p R AT repair with FHL tendon transfer 10/19/23- no prior R ankle surgeries  Current functional limitations: ambulating with crutches/ WBAT - walker boot on R leg; off work  Clinical findings:Assessment: LEFS 6./80; ankle DF a AROM limited by pain/tightness  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

## 2024-02-15 ENCOUNTER — TELEPHONE (OUTPATIENT)
Dept: CARDIOLOGY CLINIC | Age: 59
End: 2024-02-15

## 2024-02-15 ENCOUNTER — HOSPITAL ENCOUNTER (OUTPATIENT)
Dept: PHYSICAL THERAPY | Age: 59
Setting detail: THERAPIES SERIES
Discharge: HOME | End: 2024-02-15
Payer: COMMERCIAL

## 2024-02-15 PROCEDURE — 97110 THERAPEUTIC EXERCISES: CPT

## 2024-02-15 NOTE — TELEPHONE ENCOUNTER
Proceed with surgery no further testing needed   Can hold anticoagulation for 2 days before surgery  Low   risk for surgery  Hold aspirin for procedure

## 2024-02-15 NOTE — TELEPHONE ENCOUNTER
Cardiologist: Dr. Velasco  Surgeon: Dr. Duque   Surgery: Rt. 1st metatarsophalangeal fusion, extensor hallucis longus tenolysis, calcaneal bone graft    Anesthesia: Regional block with general  Date: ASAP  FAX# [phone]    # [phone]    Last OV 1/18/24 annalisa/Krista

## 2024-02-15 NOTE — FLOWSHEET NOTE
Outpatient Physical Therapy  [place]           [x] Phone: [phone]   Fax: [phone]  [place]           [] Phone: [phone]   Fax: [phone]        Physical Therapy Daily Treatment Note  Date:  2/15/2024    Patient Name:  Elia Martinez    :  1965  MRN: [id number]  Restrictions/Precautions: Restrictions/Precautions: Weight Bearing     Lower Extremity Weight Bearing Restrictions  Right Lower Extremity Weight Bearing: Weight Bearing As Tolerated  Diagnosis:   Sprain of unspecified ligament of right ankle, initial encounter [S93.401A]  Strain of right Achilles tendon, initial encounter [S86.011A]  Displaced fracture of first metatarsal bone, right foot, initial encounter for closed fracture [S92.311A]  Peroneal tendinitis, right leg [M76.71] Diagnosis: s/p R AT surgery  Date of Injury/Surgery:   Treatment Diagnosis:  R ankle stiffness  Insurance/Certification information: [facility] 2 x wk  x10 visitsWorker's comp extended the end date on Elia's C-9 to end on 2024  Referring Physician:  Ross Duque DO    PCP: Oneida Adames PA  Next Doctor Visit:    Plan of care signed (Y/N):    Outcome Measure:   Visit# / total visits     Pain level: 1-2/10 R foot  8-9/10 R hip/ back pain intense  Goals:     Patient goals: return to work  Long Term Goals  Time Frame for Long Term Goals: 10 sessions  patient will score 20/80 on LEFS indicating imporved lower extremity function with ADL/IADL  patient will actively DF R foot to 0*               Summary of Evaluation:      Patient primary complaints: R ankle stifness  History of condition:s/p R AT repair with FHL tendon transfer 10/19/23- no prior R ankle surgeries  Current functional limitations: ambulating with crutches/ WBAT - walker boot on R leg; off work  Clinical findings:Assessment: LEFS 6./80; ankle DF a AROM limited by pain/tightness  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

## 2024-06-17 ENCOUNTER — APPOINTMENT (OUTPATIENT)
Dept: CT IMAGING | Age: 59
End: 2024-06-17
Payer: COMMERCIAL

## 2024-06-17 ENCOUNTER — HOSPITAL ENCOUNTER (EMERGENCY)
Age: 59
Discharge: HOME | End: 2024-06-17
Attending: EMERGENCY MEDICINE
Payer: COMMERCIAL

## 2024-06-17 ENCOUNTER — APPOINTMENT (OUTPATIENT)
Dept: GENERAL RADIOLOGY | Age: 59
End: 2024-06-17
Payer: COMMERCIAL

## 2024-06-17 VITALS
SYSTOLIC BLOOD PRESSURE: 132 MMHG | OXYGEN SATURATION: 96 % | HEIGHT: 76 IN | WEIGHT: 315 LBS | TEMPERATURE: 97.8 F | BODY MASS INDEX: 38.36 KG/M2 | HEART RATE: 88 BPM | DIASTOLIC BLOOD PRESSURE: 72 MMHG | RESPIRATION RATE: 16 BRPM

## 2024-06-17 DIAGNOSIS — R31.9 HEMATURIA, UNSPECIFIED TYPE: ICD-10-CM

## 2024-06-17 DIAGNOSIS — N23 RENAL COLIC: ICD-10-CM

## 2024-06-17 DIAGNOSIS — R10.9 RIGHT FLANK PAIN: Primary | ICD-10-CM

## 2024-06-17 LAB
ALBUMIN SERPL-MCNC: 4.2 GM/DL (ref 3.4–5)
ALP BLD-CCNC: 88 IU/L (ref 40–129)
ALT SERPL-CCNC: 35 U/L (ref 10–40)
ANION GAP SERPL CALCULATED.3IONS-SCNC: 18 MMOL/L (ref 7–16)
AST SERPL-CCNC: 28 IU/L (ref 15–37)
BACTERIA: ABNORMAL /HPF
BASOPHILS ABSOLUTE: 0 K/CU MM
BASOPHILS RELATIVE PERCENT: 0.4 % (ref 0–1)
BILIRUB SERPL-MCNC: 0.6 MG/DL (ref 0–1)
BILIRUBIN, URINE: ABNORMAL MG/DL
BLOOD, URINE: ABNORMAL
BUN SERPL-MCNC: 17 MG/DL (ref 6–23)
CALCIUM SERPL-MCNC: 9.6 MG/DL (ref 8.3–10.6)
CAST TYPE: ABNORMAL /HPF
CHLORIDE BLD-SCNC: 103 MMOL/L (ref 99–110)
CLARITY: ABNORMAL
CO2: 20 MMOL/L (ref 21–32)
COLOR: YELLOW
CREAT SERPL-MCNC: 1 MG/DL (ref 0.9–1.3)
CRYSTAL TYPE: NEGATIVE /HPF
DIFFERENTIAL TYPE: ABNORMAL
EOSINOPHILS ABSOLUTE: 0.1 K/CU MM
EOSINOPHILS RELATIVE PERCENT: 1.1 % (ref 0–3)
EPITHELIAL CELLS, UA: 1 /HPF
GFR, ESTIMATED: 88 ML/MIN/1.73M2
GLUCOSE SERPL-MCNC: 145 MG/DL (ref 70–99)
GLUCOSE URINE: NEGATIVE MG/DL
HCT VFR BLD CALC: 46.4 % (ref 42–52)
HEMOGLOBIN: 15.7 GM/DL (ref 13.5–18)
IMMATURE NEUTROPHIL %: 0.4 % (ref 0–0.43)
KETONES, URINE: NEGATIVE MG/DL
LEUKOCYTE ESTERASE, URINE: NEGATIVE
LIPASE: 23 IU/L (ref 13–60)
LYMPHOCYTES ABSOLUTE: 1.6 K/CU MM
LYMPHOCYTES RELATIVE PERCENT: 17.1 % (ref 24–44)
MCH RBC QN AUTO: 28.4 PG (ref 27–31)
MCHC RBC AUTO-ENTMCNC: 33.8 % (ref 32–36)
MCV RBC AUTO: 83.9 FL (ref 78–100)
MONOCYTES ABSOLUTE: 0.5 K/CU MM
MONOCYTES RELATIVE PERCENT: 5.8 % (ref 0–4)
NEUTROPHILS ABSOLUTE: 6.8 K/CU MM
NEUTROPHILS RELATIVE PERCENT: 75.2 % (ref 36–66)
NITRITE URINE, QUANTITATIVE: NEGATIVE
PDW BLD-RTO: 13.4 % (ref 11.7–14.9)
PH, URINE: 5.5 (ref 5–8)
PLATELET # BLD: 289 K/CU MM (ref 140–440)
PMV BLD AUTO: 9.7 FL (ref 7.5–11.1)
POTASSIUM SERPL-SCNC: 3.9 MMOL/L (ref 3.5–5.1)
PROTEIN UA: 30 MG/DL
RBC # BLD: 5.53 M/CU MM (ref 4.6–6.2)
RBC URINE: 15 /HPF (ref 0–3)
SODIUM BLD-SCNC: 141 MMOL/L (ref 135–145)
SPECIFIC GRAVITY UA: >1.03 (ref 1–1.03)
TOTAL IMMATURE NEUTOROPHIL: 0.04 K/CU MM
TOTAL PROTEIN: 6.9 GM/DL (ref 6.4–8.2)
UROBILINOGEN, URINE: 1 MG/DL (ref 0.2–1)
WBC # BLD: 9.1 K/CU MM (ref 4–10.5)
WBC UA: 5 /HPF (ref 0–2)

## 2024-06-17 PROCEDURE — 99284 EMERGENCY DEPT VISIT MOD MDM: CPT

## 2024-06-17 PROCEDURE — 83690 ASSAY OF LIPASE: CPT

## 2024-06-17 PROCEDURE — 96374 THER/PROPH/DIAG INJ IV PUSH: CPT

## 2024-06-17 PROCEDURE — 80053 COMPREHEN METABOLIC PANEL: CPT

## 2024-06-17 PROCEDURE — 6370000000 HC RX 637 (ALT 250 FOR IP): Performed by: EMERGENCY MEDICINE

## 2024-06-17 PROCEDURE — 74018 RADEX ABDOMEN 1 VIEW: CPT

## 2024-06-17 PROCEDURE — 81001 URINALYSIS AUTO W/SCOPE: CPT

## 2024-06-17 PROCEDURE — 6360000002 HC RX W HCPCS: Performed by: EMERGENCY MEDICINE

## 2024-06-17 PROCEDURE — 85025 COMPLETE CBC W/AUTO DIFF WBC: CPT

## 2024-06-17 PROCEDURE — 87086 URINE CULTURE/COLONY COUNT: CPT

## 2024-06-17 PROCEDURE — 2580000003 HC RX 258: Performed by: EMERGENCY MEDICINE

## 2024-06-17 PROCEDURE — 96375 TX/PRO/DX INJ NEW DRUG ADDON: CPT

## 2024-06-17 RX ORDER — HYDROCODONE BITARTRATE AND ACETAMINOPHEN 5; 325 MG/1; MG/1
1 TABLET ORAL
Status: COMPLETED | OUTPATIENT
Start: 2024-06-17 | End: 2024-06-17

## 2024-06-17 RX ORDER — ONDANSETRON 4 MG/1
4 TABLET, FILM COATED ORAL 3 TIMES DAILY PRN
Qty: 15 TABLET | Refills: 0 | Status: SHIPPED | OUTPATIENT
Start: 2024-06-17

## 2024-06-17 RX ORDER — 0.9 % SODIUM CHLORIDE 0.9 %
1000 INTRAVENOUS SOLUTION INTRAVENOUS ONCE
Status: COMPLETED | OUTPATIENT
Start: 2024-06-17 | End: 2024-06-17

## 2024-06-17 RX ORDER — KETOROLAC TROMETHAMINE 15 MG/ML
15 INJECTION, SOLUTION INTRAMUSCULAR; INTRAVENOUS ONCE
Status: COMPLETED | OUTPATIENT
Start: 2024-06-17 | End: 2024-06-17

## 2024-06-17 RX ORDER — TAMSULOSIN HYDROCHLORIDE 0.4 MG/1
0.4 CAPSULE ORAL ONCE
Status: COMPLETED | OUTPATIENT
Start: 2024-06-17 | End: 2024-06-17

## 2024-06-17 RX ORDER — KETOROLAC TROMETHAMINE 10 MG/1
10 TABLET, FILM COATED ORAL EVERY 6 HOURS PRN
Qty: 20 TABLET | Refills: 0 | Status: SHIPPED | OUTPATIENT
Start: 2024-06-17 | End: 2024-06-22

## 2024-06-17 RX ORDER — FENTANYL CITRATE 50 UG/ML
50 INJECTION, SOLUTION INTRAMUSCULAR; INTRAVENOUS ONCE
Status: COMPLETED | OUTPATIENT
Start: 2024-06-17 | End: 2024-06-17

## 2024-06-17 RX ORDER — LORAZEPAM 2 MG/ML
1 INJECTION INTRAMUSCULAR ONCE
Status: COMPLETED | OUTPATIENT
Start: 2024-06-17 | End: 2024-06-17

## 2024-06-17 RX ORDER — TAMSULOSIN HYDROCHLORIDE 0.4 MG/1
0.4 CAPSULE ORAL DAILY
Qty: 7 CAPSULE | Refills: 0 | Status: SHIPPED | OUTPATIENT
Start: 2024-06-17 | End: 2024-06-24

## 2024-06-17 RX ORDER — ONDANSETRON 2 MG/ML
4 INJECTION INTRAMUSCULAR; INTRAVENOUS EVERY 30 MIN PRN
Status: DISCONTINUED | OUTPATIENT
Start: 2024-06-17 | End: 2024-06-17 | Stop reason: HOSPADM

## 2024-06-17 RX ADMIN — LORAZEPAM 1 MG: 2 INJECTION INTRAMUSCULAR; INTRAVENOUS at 20:39

## 2024-06-17 RX ADMIN — KETOROLAC TROMETHAMINE 15 MG: 15 INJECTION, SOLUTION INTRAMUSCULAR; INTRAVENOUS at 20:03

## 2024-06-17 RX ADMIN — SODIUM CHLORIDE 1000 ML: 9 INJECTION, SOLUTION INTRAVENOUS at 20:06

## 2024-06-17 RX ADMIN — FENTANYL CITRATE 50 MCG: 50 INJECTION INTRAMUSCULAR; INTRAVENOUS at 20:06

## 2024-06-17 RX ADMIN — ONDANSETRON 4 MG: 2 INJECTION INTRAMUSCULAR; INTRAVENOUS at 20:06

## 2024-06-17 RX ADMIN — HYDROCODONE BITARTRATE AND ACETAMINOPHEN 1 TABLET: 5; 325 TABLET ORAL at 21:47

## 2024-06-17 RX ADMIN — TAMSULOSIN HYDROCHLORIDE 0.4 MG: 0.4 CAPSULE ORAL at 21:44

## 2024-06-17 ASSESSMENT — PAIN SCALES - GENERAL
PAINLEVEL_OUTOF10: 10
PAINLEVEL_OUTOF10: 4
PAINLEVEL_OUTOF10: 7
PAINLEVEL_OUTOF10: 10

## 2024-06-17 ASSESSMENT — PAIN DESCRIPTION - FREQUENCY
FREQUENCY: CONTINUOUS

## 2024-06-17 ASSESSMENT — PAIN - FUNCTIONAL ASSESSMENT
PAIN_FUNCTIONAL_ASSESSMENT: PREVENTS OR INTERFERES WITH MANY ACTIVE NOT PASSIVE ACTIVITIES
PAIN_FUNCTIONAL_ASSESSMENT: 0-10
PAIN_FUNCTIONAL_ASSESSMENT: PREVENTS OR INTERFERES SOME ACTIVE ACTIVITIES AND ADLS
PAIN_FUNCTIONAL_ASSESSMENT: PREVENTS OR INTERFERES SOME ACTIVE ACTIVITIES AND ADLS

## 2024-06-17 ASSESSMENT — PAIN DESCRIPTION - DESCRIPTORS
DESCRIPTORS: SHARP
DESCRIPTORS: ACHING;DISCOMFORT;SHOOTING
DESCRIPTORS: SHARP

## 2024-06-17 ASSESSMENT — PAIN DESCRIPTION - PAIN TYPE
TYPE: ACUTE PAIN

## 2024-06-17 ASSESSMENT — LIFESTYLE VARIABLES
HOW MANY STANDARD DRINKS CONTAINING ALCOHOL DO YOU HAVE ON A TYPICAL DAY: 1 OR 2
HOW OFTEN DO YOU HAVE A DRINK CONTAINING ALCOHOL: 2-4 TIMES A MONTH

## 2024-06-17 ASSESSMENT — PAIN DESCRIPTION - LOCATION
LOCATION: FLANK

## 2024-06-17 ASSESSMENT — PAIN DESCRIPTION - ORIENTATION
ORIENTATION: RIGHT

## 2024-06-17 ASSESSMENT — PAIN DESCRIPTION - ONSET
ONSET: ON-GOING
ONSET: ON-GOING

## 2024-06-18 NOTE — ED NOTES
Discharged with instructions reviewed with patient and patient stated understanding.  Pt walked out of the ER.

## 2024-06-18 NOTE — ED PROVIDER NOTES
Triage Chief Complaint:    Flank Pain (C/o rt flank pain since 4 pm. C/o some vomiting .)    HPI   Elia Martinez is a 58 y.o. male that presents for evaluation of right flank pain since 4 PM.  Patient had reported that feels similar to prior episode that he had with kidney stones.  Patient states it radiates into the groin.  He has not noticed any back pain with this.  He states it has been intermittent in nature.  Is associated with nonbloody nonbilious emesis.  No dysuria.  He has noticed blood in his urine however.  He denies any trauma.  No rashes.  Denies any leg pain.  Patient has not had any over-the-counter medications prior to arrival that have provided any relief.  Patient has stated that the last meal kidney stone was 20 years ago and at that time he did require surgery.     History from : Patient    Limitations to history : None    ROS:  10 systems reviewed and negative except as above.     Past Medical History:   Diagnosis Date    Atrial fibrillation (HCC)     Breast mass     Dyspnea     H/O echocardiogram 10/29/2019    EF 55-60%, Mild bilateral atrial and right ventricular dilatation.    History of echocardiogram 11/28/2018    Limited, EF 55-60%    History of stress test 01/08/2018    EF 32% with global hypokinesis pt was in afib during exam. needs invasive work up for further discussion    Hypercholesterolemia     Melena     Snoring      Past Surgical History:   Procedure Laterality Date    ATRIAL ABLATION SURGERY N/A 04/25/2018    AFIB    BACK SURGERY      3- discectomy    CARPAL TUNNEL RELEASE Bilateral 1983    Carpal tunnel bilatera    ELBOW SURGERY      bicep muscle tendon repair    LITHOTRIPSY       Family History   Problem Relation Age of Onset    Hypotension Father     Diabetes Father     Asthma Maternal Grandmother      Social History     Socioeconomic History    Marital status:      Spouse name: Not on file    Number of children: Not on file    Years of education: Not on file

## 2024-06-19 LAB
CULTURE: NORMAL
Lab: NORMAL
SPECIMEN: NORMAL

## 2024-07-03 ENCOUNTER — APPOINTMENT (OUTPATIENT)
Dept: ULTRASOUND IMAGING | Age: 59
End: 2024-07-03
Payer: COMMERCIAL

## 2024-07-03 ENCOUNTER — HOSPITAL ENCOUNTER (EMERGENCY)
Age: 59
Discharge: HOME | End: 2024-07-03
Payer: COMMERCIAL

## 2024-07-03 VITALS
TEMPERATURE: 97.4 F | DIASTOLIC BLOOD PRESSURE: 96 MMHG | OXYGEN SATURATION: 96 % | WEIGHT: 315 LBS | HEIGHT: 76 IN | RESPIRATION RATE: 24 BRPM | BODY MASS INDEX: 38.36 KG/M2 | SYSTOLIC BLOOD PRESSURE: 152 MMHG | HEART RATE: 77 BPM

## 2024-07-03 DIAGNOSIS — R10.9 FLANK PAIN: Primary | ICD-10-CM

## 2024-07-03 LAB
ALBUMIN SERPL-MCNC: 4.3 GM/DL (ref 3.4–5)
ALP BLD-CCNC: 81 IU/L (ref 40–129)
ALT SERPL-CCNC: 34 U/L (ref 10–40)
ANION GAP SERPL CALCULATED.3IONS-SCNC: 14 MMOL/L (ref 7–16)
AST SERPL-CCNC: 27 IU/L (ref 15–37)
BACTERIA: ABNORMAL /HPF
BASOPHILS ABSOLUTE: 0 K/CU MM
BASOPHILS RELATIVE PERCENT: 0.2 % (ref 0–1)
BILIRUB SERPL-MCNC: 1 MG/DL (ref 0–1)
BILIRUBIN, URINE: NEGATIVE MG/DL
BLOOD, URINE: ABNORMAL
BUN SERPL-MCNC: 22 MG/DL (ref 6–23)
CALCIUM SERPL-MCNC: 9.7 MG/DL (ref 8.3–10.6)
CAST TYPE: ABNORMAL /HPF
CHLORIDE BLD-SCNC: 101 MMOL/L (ref 99–110)
CLARITY, UA: CLEAR
CO2: 22 MMOL/L (ref 21–32)
COLOR, UA: YELLOW
CREAT SERPL-MCNC: 1.6 MG/DL (ref 0.9–1.3)
CRYSTAL TYPE: NEGATIVE /HPF
DIFFERENTIAL TYPE: ABNORMAL
EOSINOPHILS ABSOLUTE: 0 K/CU MM
EOSINOPHILS RELATIVE PERCENT: 0.1 % (ref 0–3)
EPITHELIAL CELLS, UA: NEGATIVE /HPF
GFR, ESTIMATED: 50 ML/MIN/1.73M2
GLUCOSE SERPL-MCNC: 126 MG/DL (ref 70–99)
GLUCOSE URINE: NEGATIVE MG/DL
HCT VFR BLD CALC: 45.7 % (ref 42–52)
HEMOGLOBIN: 15.2 GM/DL (ref 13.5–18)
IMMATURE NEUTROPHIL %: 0.4 % (ref 0–0.43)
KETONES, URINE: NEGATIVE MG/DL
LEUKOCYTE ESTERASE, URINE: ABNORMAL
LIPASE: 21 IU/L (ref 13–60)
LYMPHOCYTES ABSOLUTE: 1.5 K/CU MM
LYMPHOCYTES RELATIVE PERCENT: 10.8 % (ref 24–44)
MCH RBC QN AUTO: 27.8 PG (ref 27–31)
MCHC RBC AUTO-ENTMCNC: 33.3 % (ref 32–36)
MCV RBC AUTO: 83.7 FL (ref 78–100)
MONOCYTES ABSOLUTE: 1.1 K/CU MM
MONOCYTES RELATIVE PERCENT: 7.8 % (ref 0–4)
NEUTROPHILS ABSOLUTE: 11.2 K/CU MM
NEUTROPHILS RELATIVE PERCENT: 80.7 % (ref 36–66)
NITRITE URINE, QUANTITATIVE: NEGATIVE
PDW BLD-RTO: 13.7 % (ref 11.7–14.9)
PH, URINE: 6 (ref 5–8)
PLATELET # BLD: 296 K/CU MM (ref 140–440)
PMV BLD AUTO: 9.9 FL (ref 7.5–11.1)
POTASSIUM SERPL-SCNC: 3.9 MMOL/L (ref 3.5–5.1)
PROTEIN UA: NEGATIVE MG/DL
RBC # BLD: 5.46 M/CU MM (ref 4.6–6.2)
RBC URINE: 3 /HPF (ref 0–3)
SODIUM BLD-SCNC: 137 MMOL/L (ref 135–145)
SPECIFIC GRAVITY UA: 1.01 (ref 1–1.03)
TOTAL IMMATURE NEUTOROPHIL: 0.05 K/CU MM
TOTAL PROTEIN: 7.1 GM/DL (ref 6.4–8.2)
UROBILINOGEN, URINE: 0.2 MG/DL (ref 0.2–1)
WBC # BLD: 13.9 K/CU MM (ref 4–10.5)
WBC UA: 5 /HPF (ref 0–2)

## 2024-07-03 PROCEDURE — 85025 COMPLETE CBC W/AUTO DIFF WBC: CPT

## 2024-07-03 PROCEDURE — 80053 COMPREHEN METABOLIC PANEL: CPT

## 2024-07-03 PROCEDURE — 76770 US EXAM ABDO BACK WALL COMP: CPT

## 2024-07-03 PROCEDURE — 96374 THER/PROPH/DIAG INJ IV PUSH: CPT

## 2024-07-03 PROCEDURE — 6360000002 HC RX W HCPCS: Performed by: EMERGENCY MEDICINE

## 2024-07-03 PROCEDURE — 81001 URINALYSIS AUTO W/SCOPE: CPT

## 2024-07-03 PROCEDURE — 96375 TX/PRO/DX INJ NEW DRUG ADDON: CPT

## 2024-07-03 PROCEDURE — 83690 ASSAY OF LIPASE: CPT

## 2024-07-03 PROCEDURE — 99284 EMERGENCY DEPT VISIT MOD MDM: CPT

## 2024-07-03 PROCEDURE — 2580000003 HC RX 258: Performed by: EMERGENCY MEDICINE

## 2024-07-03 RX ORDER — KETOROLAC TROMETHAMINE 30 MG/ML
30 INJECTION, SOLUTION INTRAMUSCULAR; INTRAVENOUS ONCE
Status: COMPLETED | OUTPATIENT
Start: 2024-07-03 | End: 2024-07-03

## 2024-07-03 RX ORDER — 0.9 % SODIUM CHLORIDE 0.9 %
1000 INTRAVENOUS SOLUTION INTRAVENOUS ONCE
Status: COMPLETED | OUTPATIENT
Start: 2024-07-03 | End: 2024-07-03

## 2024-07-03 RX ORDER — IBUPROFEN 600 MG/1
600 TABLET ORAL 3 TIMES DAILY PRN
Qty: 30 TABLET | Refills: 0 | Status: SHIPPED | OUTPATIENT
Start: 2024-07-03

## 2024-07-03 RX ORDER — CIPROFLOXACIN 500 MG/1
500 TABLET, FILM COATED ORAL 2 TIMES DAILY
Qty: 14 TABLET | Refills: 0 | Status: SHIPPED | OUTPATIENT
Start: 2024-07-03 | End: 2024-07-10

## 2024-07-03 RX ORDER — ONDANSETRON 2 MG/ML
8 INJECTION INTRAMUSCULAR; INTRAVENOUS ONCE
Status: COMPLETED | OUTPATIENT
Start: 2024-07-03 | End: 2024-07-03

## 2024-07-03 RX ADMIN — ONDANSETRON 8 MG: 2 INJECTION INTRAMUSCULAR; INTRAVENOUS at 13:42

## 2024-07-03 RX ADMIN — KETOROLAC TROMETHAMINE 30 MG: 30 INJECTION, SOLUTION INTRAMUSCULAR at 13:44

## 2024-07-03 RX ADMIN — SODIUM CHLORIDE 1000 ML: 9 INJECTION, SOLUTION INTRAVENOUS at 13:41

## 2024-07-03 ASSESSMENT — PAIN DESCRIPTION - FREQUENCY
FREQUENCY: CONTINUOUS
FREQUENCY: CONTINUOUS

## 2024-07-03 ASSESSMENT — LIFESTYLE VARIABLES
HOW OFTEN DO YOU HAVE A DRINK CONTAINING ALCOHOL: 2-4 TIMES A MONTH
HOW MANY STANDARD DRINKS CONTAINING ALCOHOL DO YOU HAVE ON A TYPICAL DAY: 1 OR 2

## 2024-07-03 ASSESSMENT — PAIN SCALES - GENERAL
PAINLEVEL_OUTOF10: 10
PAINLEVEL_OUTOF10: 10
PAINLEVEL_OUTOF10: 3

## 2024-07-03 ASSESSMENT — PAIN DESCRIPTION - ORIENTATION
ORIENTATION: RIGHT
ORIENTATION: RIGHT

## 2024-07-03 ASSESSMENT — PAIN DESCRIPTION - LOCATION
LOCATION: FLANK
LOCATION: FLANK

## 2024-07-03 ASSESSMENT — PAIN DESCRIPTION - DESCRIPTORS: DESCRIPTORS: SHARP

## 2024-07-03 ASSESSMENT — PAIN - FUNCTIONAL ASSESSMENT: PAIN_FUNCTIONAL_ASSESSMENT: 0-10

## 2024-07-03 ASSESSMENT — PAIN DESCRIPTION - PAIN TYPE
TYPE: ACUTE PAIN
TYPE: ACUTE PAIN

## 2024-07-03 NOTE — ED NOTES
Pt states his pain is much better  
The client is observed to ambulate with a steady gait, exhibits no shuffling or hesitation with steps, and performs this task with no assistance from a cane or walker or crutches. The provider is aware of the client's ambulatory status.      
tablet by mouth 2 times daily (with meals) for 20 doses 20 tablet 0    Multiple Vitamins-Minerals (THERAPEUTIC MULTIVITAMIN-MINERALS) tablet Take 1 tablet by mouth daily      Omega-3 Fatty Acids (FISH OIL OMEGA-3 PO) Take by mouth      CPAP Machine MISC by Does not apply route       Allergies   Allergen Reactions    Penicillins      Cant remember his reaction        Nursing Notes Reviewed    Physical Exam:  Triage VS:    ED Triage Vitals [07/03/24 1254]   Enc Vitals Group      BP (!) 142/106      Pulse 85      Respirations 24      Temp 97.4 °F (36.3 °C)      Temp Source Oral      SpO2 96 %      Weight - Scale (!) 158.8 kg (350 lb)      Height 1.93 m (6' 4\")      Head Circumference       Peak Flow       Pain Score       Pain Loc       Pain Edu?       Excl. in GC?        My pulse ox interpretation is - normal    General appearance:  No acute distress.   Skin:  Warm. Dry.   Eye:  Extraocular movements intact.     Ears, nose, mouth and throat:  Oral mucosa moist   Neck:  Trachea midline.   Extremity:  No swelling.  Normal ROM     Heart:  Regular rate and rhythm, normal S1 & S2, no extra heart sounds.    Perfusion:  intact  Respiratory:  Lungs clear to auscultation bilaterally.  Respirations nonlabored.     Abdominal:  Normal bowel sounds.  Soft.  Nontender.  Non distended.  Back:  No CVA tenderness to palpation     Neurological:  Alert and oriented times 3.  No focal neuro deficits.             Psychiatric:  Appropriate    I have reviewed and interpreted all of the currently available lab results from this visit (if applicable):  Results for orders placed or performed during the hospital encounter of 07/03/24   CBC with Auto Differential   Result Value Ref Range    WBC 13.9 (H) 4.0 - 10.5 K/CU MM    RBC 5.46 4.6 - 6.2 M/CU MM    Hemoglobin 15.2 13.5 - 18.0 GM/DL    Hematocrit 45.7 42 - 52 %    MCV 83.7 78 - 100 FL    MCH 27.8 27 - 31 PG    MCHC 33.3 32.0 - 36.0 %    RDW 13.7 11.7 - 14.9 %    Platelets 296 140 - 440 K/CU

## 2024-08-02 ENCOUNTER — OFFICE VISIT (OUTPATIENT)
Dept: CARDIOLOGY CLINIC | Age: 59
End: 2024-08-02
Payer: COMMERCIAL

## 2024-08-02 VITALS
BODY MASS INDEX: 38.36 KG/M2 | DIASTOLIC BLOOD PRESSURE: 124 MMHG | WEIGHT: 315 LBS | HEIGHT: 76 IN | HEART RATE: 81 BPM | SYSTOLIC BLOOD PRESSURE: 170 MMHG

## 2024-08-02 DIAGNOSIS — G47.33 OSA ON CPAP: ICD-10-CM

## 2024-08-02 DIAGNOSIS — I73.9 CLAUDICATION (HCC): ICD-10-CM

## 2024-08-02 DIAGNOSIS — E78.5 DYSLIPIDEMIA: ICD-10-CM

## 2024-08-02 DIAGNOSIS — E66.01 CLASS 3 SEVERE OBESITY DUE TO EXCESS CALORIES WITH SERIOUS COMORBIDITY AND BODY MASS INDEX (BMI) OF 40.0 TO 44.9 IN ADULT (HCC): ICD-10-CM

## 2024-08-02 DIAGNOSIS — Z98.890 S/P ABLATION OF ATRIAL FIBRILLATION: ICD-10-CM

## 2024-08-02 DIAGNOSIS — Z86.79 S/P ABLATION OF ATRIAL FIBRILLATION: ICD-10-CM

## 2024-08-02 DIAGNOSIS — I42.0 DILATED CARDIOMYOPATHY (HCC): ICD-10-CM

## 2024-08-02 DIAGNOSIS — I10 PRIMARY HYPERTENSION: Primary | ICD-10-CM

## 2024-08-02 DIAGNOSIS — I48.0 PAROXYSMAL ATRIAL FIBRILLATION (HCC): ICD-10-CM

## 2024-08-02 PROCEDURE — 3080F DIAST BP >= 90 MM HG: CPT | Performed by: NURSE PRACTITIONER

## 2024-08-02 PROCEDURE — 93000 ELECTROCARDIOGRAM COMPLETE: CPT | Performed by: NURSE PRACTITIONER

## 2024-08-02 PROCEDURE — 3077F SYST BP >= 140 MM HG: CPT | Performed by: NURSE PRACTITIONER

## 2024-08-02 PROCEDURE — 99214 OFFICE O/P EST MOD 30 MIN: CPT | Performed by: NURSE PRACTITIONER

## 2024-08-02 RX ORDER — MELOXICAM 7.5 MG/1
7.5 TABLET ORAL DAILY
COMMUNITY

## 2024-08-02 ASSESSMENT — ENCOUNTER SYMPTOMS
BACK PAIN: 1
SHORTNESS OF BREATH: 0
COUGH: 0

## 2024-09-30 RX ORDER — METOPROLOL TARTRATE 25 MG/1
25 TABLET, FILM COATED ORAL 2 TIMES DAILY
Qty: 180 TABLET | Refills: 3 | Status: SHIPPED | OUTPATIENT
Start: 2024-09-30

## 2024-12-20 ENCOUNTER — OFFICE VISIT (OUTPATIENT)
Dept: CARDIOLOGY CLINIC | Age: 59
End: 2024-12-20
Payer: COMMERCIAL

## 2024-12-20 VITALS
HEART RATE: 62 BPM | SYSTOLIC BLOOD PRESSURE: 146 MMHG | BODY MASS INDEX: 38.36 KG/M2 | WEIGHT: 315 LBS | HEIGHT: 76 IN | DIASTOLIC BLOOD PRESSURE: 84 MMHG

## 2024-12-20 DIAGNOSIS — Z98.890 S/P ABLATION OF ATRIAL FIBRILLATION: Primary | ICD-10-CM

## 2024-12-20 DIAGNOSIS — I42.0 DILATED CARDIOMYOPATHY (HCC): ICD-10-CM

## 2024-12-20 DIAGNOSIS — I48.0 PAROXYSMAL ATRIAL FIBRILLATION (HCC): ICD-10-CM

## 2024-12-20 DIAGNOSIS — E78.5 DYSLIPIDEMIA: ICD-10-CM

## 2024-12-20 DIAGNOSIS — I10 PRIMARY HYPERTENSION: ICD-10-CM

## 2024-12-20 DIAGNOSIS — Z86.79 S/P ABLATION OF ATRIAL FIBRILLATION: Primary | ICD-10-CM

## 2024-12-20 DIAGNOSIS — G47.33 OSA ON CPAP: ICD-10-CM

## 2024-12-20 PROCEDURE — 3077F SYST BP >= 140 MM HG: CPT | Performed by: NURSE PRACTITIONER

## 2024-12-20 PROCEDURE — 99214 OFFICE O/P EST MOD 30 MIN: CPT | Performed by: NURSE PRACTITIONER

## 2024-12-20 PROCEDURE — 3079F DIAST BP 80-89 MM HG: CPT | Performed by: NURSE PRACTITIONER

## 2024-12-20 PROCEDURE — 93000 ELECTROCARDIOGRAM COMPLETE: CPT | Performed by: NURSE PRACTITIONER

## 2024-12-20 RX ORDER — METHOCARBAMOL 500 MG/1
500 TABLET, FILM COATED ORAL 3 TIMES DAILY
COMMUNITY

## 2024-12-20 RX ORDER — GABAPENTIN 300 MG/1
300 CAPSULE ORAL 3 TIMES DAILY
COMMUNITY

## 2024-12-20 RX ORDER — CARVEDILOL 3.12 MG/1
3.12 TABLET ORAL 2 TIMES DAILY
Qty: 180 TABLET | Refills: 4 | Status: SHIPPED | OUTPATIENT
Start: 2024-12-20

## 2024-12-20 NOTE — PROGRESS NOTES
CARDIOLOGY  NOTE    2024    Elia Martinez (:  1965) is a 59 y.o. male,an established patient with Dr. Velasco, here for evaluation of the following chief complaint(s):  3 Month Follow-Up (Patient denies chest pain, palpitations, SOB, dizziness. R leg swelling, pt states currently is due to back. )      SUBJECTIVE/OBJECTIVE:    HPI  Elia is here to follow up on his cardiovascular health.     He states that he has been working with Workmen's Comp. and occupational to get pain control.  He had an injection for his hip pain yesterday he now states that he is having difficulty with lifting his leg feels like his foot is dragging when walking and his leg is numb left knee is  numb.  He states that he stopped all of his medications because he lost 30 pounds.  Reports systolic blood pressure was in the 120s to low 130s prior to having injection and he had been off of medication for 5 weeks.  He does not like taking blood pressure medication as his weight goes up.    Elia has a history of cardiomyopathy, dyslipidemia, claudication, obstructive sleep apnea, orthostatic dizziness, PAF, hypertension, status post ablation of atrial fibrillation, morbid obesity and chronic pain.    Elia is a non-smoker.       Review of Systems   Constitutional:  Negative for fatigue and fever.   Respiratory:  Negative for cough and shortness of breath.    Cardiovascular:  Negative for chest pain, palpitations and leg swelling.   Musculoskeletal:  Positive for back pain. Negative for arthralgias and gait problem.   Neurological:  Positive for weakness (Right leg) and numbness. Negative for dizziness, syncope, light-headedness and headaches.       Vitals:    24 1006   BP: (!) 146/84   Pulse: 62   Weight: (!) 151.5 kg (334 lb)   Height: 1.93 m (6' 4\")         Wt Readings from Last 3 Encounters:   24 (!) 151.5 kg (334 lb)   24 (!) 155 kg (341 lb 12.8 oz)   24 (!) 158.8 kg (350 lb)       BP Readings from Last

## 2024-12-23 NOTE — PROGRESS NOTES
Please see the attached refill request.   Left Ventricular size is dilated.   Moderate bilateral atrial dilatation.   Doppler evaluation reveals mild aortic, mitral, tricuspid regurgitation.   No evidence of pericardial effusion.    stress test 1/8/18  Summary    A.Fib may affect EF results adversely    Supervising physician Dr. Medina .    non specific T wave inversion in lat leads but negative for ischemia by    diagnostic criteria    Abnormal reduced EF 32 % with global hypokinesis, patient was in afib during    exam    Moderate intensity medium size lat wall reversible defect in stress images    Fixed inferior defect consisted with diaphragmatic artifact    dilated left ventricle   Cath 3/14/18  Procedure Summary   Radial Access, Normal coronaries.LVEDp was 18 mmHG   No significant CAD, Right dominant system   Pt noted to be in afib      Angiographic Findings    Diagnostic Findings    Cardiac Arteries and Lesion Findings     LMCA: Normal and No Angiographicalyl Significant Disease.  LAD: Normal (no stenosis %) and No Angiographicalyl Significant Disease.Type  III vessel , 2 large diagonal are widely patent  LCx: widely patent  RCA: Normal (no stenosis %) and No Angiographicalyl Significant Disease.gives  PDA , it is widely patent , right dominant vessel, large calibre vessel     Echo 11/28/18  Summary   This is a limited echo to assess ejection fraction and regional wall motion.   Left ventricular systolic function is normal. Ejection fraction of 55-60%.   EF has improved compared to previous echo in Jan 2018   Normal pulmonary artery pressure.       ASSESSMENT/PLAN:  Hypertension  UnControlled  Had back injections yesterday and is off his CPAP  Encourage low-sodium diet  Encouraged to check blood pressure daily  Reviewed cause could be untreated or injection yesterday.  Reviewed if blood pressure remains uncontrolled may not be able to have next injection next week.  Restart metoprolol 25 mg twice a day    Numbness and weakness of right leg  Denies

## 2025-01-31 ENCOUNTER — TELEPHONE (OUTPATIENT)
Dept: CARDIOLOGY CLINIC | Age: 60
End: 2025-01-31

## 2025-02-06 ENCOUNTER — TELEPHONE (OUTPATIENT)
Dept: CARDIOLOGY CLINIC | Age: 60
End: 2025-02-06

## 2025-02-06 NOTE — TELEPHONE ENCOUNTER
Proceed with surgery no further testing needed   Can hold anticoagulation for 2 days before surgery   moderate  risk for surgery  Continue aspirin for procedure

## 2025-02-06 NOTE — TELEPHONE ENCOUNTER
Cardiologist: Dr. Velasco  Surgeon: Dr. Blair   Surgery: Rt. Total hip   Anesthesia: TBD  Date: 2/12/25  FAX# [phone]    Ph# [phone]    Last OV 12/20/24 María Elena

## 2025-02-06 NOTE — TELEPHONE ENCOUNTER
EREN Cardio CECILIA from [facility] with [facility] requesting cardiac clearance for pt, having sx next week total hip replacement with Dr. An.  Their fax is [phone]

## 2025-02-14 ENCOUNTER — HOSPITAL ENCOUNTER (OUTPATIENT)
Dept: PHYSICAL THERAPY | Age: 60
Setting detail: THERAPIES SERIES
Discharge: HOME | End: 2025-02-14
Payer: COMMERCIAL

## 2025-02-14 PROCEDURE — 97116 GAIT TRAINING THERAPY: CPT

## 2025-02-14 PROCEDURE — 97162 PT EVAL MOD COMPLEX 30 MIN: CPT

## 2025-02-14 ASSESSMENT — PAIN SCALES - GENERAL: PAINLEVEL_OUTOF10: 5

## 2025-02-14 ASSESSMENT — PAIN DESCRIPTION - PAIN TYPE: TYPE: SURGICAL PAIN

## 2025-02-14 ASSESSMENT — PAIN DESCRIPTION - LOCATION: LOCATION: HIP

## 2025-02-14 ASSESSMENT — PAIN DESCRIPTION - ORIENTATION: ORIENTATION: RIGHT

## 2025-02-14 NOTE — PROGRESS NOTES
Physical Therapy: Initial Evaluation    Patient: Elia Martinez (59 y.o. male)   Examination Date: 2025  Plan of Care Certification Period: 2025 to        :  1965 ;    Confirmed: {YES/NO:::\"Yes\"} MRN: [id number]  CSN: [id number]   Insurance: Payor: [employer] / Plan: [employer] / Product Type: *No Product type* /   Insurance ID: [id number] - (Worker's Comp) Secondary Insurance (if applicable):    Referring Physician: Ron Blair MD     PCP: Oneida Adames PA Visits to Date/Visits Approved:   /      No Show/Cancelled Appts:   /       Medical Diagnosis: Unilateral primary osteoarthritis, right hip [M16.11] R KARY 25  Treatment Diagnosis:       PERTINENT MEDICAL HISTORY   Patient Assessed for Rehabilitation Services: Yes       Medical History: Chart Reviewed: Yes   Past Medical History:   Diagnosis Date    Atrial fibrillation (HCC)     Breast mass     Dyspnea     H/O echocardiogram 10/29/2019    EF 55-60%, Mild bilateral atrial and right ventricular dilatation.    History of echocardiogram 2018    Limited, EF 55-60%    History of stress test 2018    EF 32% with global hypokinesis pt was in afib during exam. needs invasive work up for further discussion    Hypercholesterolemia     Melena     Snoring      Surgical History:   Past Surgical History:   Procedure Laterality Date    ATRIAL ABLATION SURGERY N/A 2018    AFIB    BACK SURGERY      3- discectomy    CARPAL TUNNEL RELEASE Bilateral     Carpal tunnel bilatera    ELBOW SURGERY      bicep muscle tendon repair    INJECTION      hip and knees    LITHOTRIPSY         Medications:   Current Outpatient Medications:     gabapentin (NEURONTIN) 300 MG capsule, Take 1 capsule by mouth 3 times daily., Disp: , Rfl:     methocarbamol (ROBAXIN) 500 MG tablet, Take 1 tablet by mouth 3 times daily, Disp: , Rfl:     carvedilol (COREG) 3.125 MG tablet, Take 1 tablet by mouth 2 times daily, Disp: 180 tablet,

## 2025-02-14 NOTE — FLOWSHEET NOTE
Outpatient Physical Therapy  [place]           [x] Phone: [phone]   Fax: [phone]  [place]           [] Phone: [phone]   Fax: [phone]        Physical Therapy Daily Treatment Note  Date:  2025    Patient Name:  Elia Martinez    :  1965  MRN: [id number]  Restrictions/Precautions: Restrictions/Precautions: Weight Bearing     Lower Extremity Weight Bearing Restrictions  Right Lower Extremity Weight Bearing: Weight Bearing As Tolerated  Diagnosis:   Unilateral primary osteoarthritis, right hip [M16.11] Diagnosis: R KARY 25  Date of Injury/Surgery:   Treatment Diagnosis:     Insurance/Certification information: [facility]  Referring Physician:  Ron Blair MD     PCP: Oneida Adames PA  Next Doctor Visit:    Plan of care signed (Y/N):    Outcome Measure:   Visit# / total visits:   /  Pain level: /10   Goals:     Patient goals: short term goal- sleep in bed  Short term goals  Time Frame for Short term goals:                   Long Term Goals  Time Frame for Long Term Goals: plan expires 3/14/25  patient will score 10/80 on LEFS indicating imporved lower extremity function with ADL/IADL  patient will be indpendent with HEP  patient will walk 250 ft in 2 MWT with LRAD            Summary of Evaluation:           Subjective:  See eval         Any changes in Ambulatory Summary Sheet?  None        Objective:  See eval           Exercises: (No more than 4 columns)   Exercise/Equipment Date Date Date           WARM UP                     TABLE                                       STANDING      Side step       DF/PF on foam                                         PROPRIOCEPTION                                    MODALITIES                      Other Therapeutic Activities/Education:        Home Exercise Program:        Manual Treatments:        Modalities:        Communication with other providers:        Assessment:  (Response towards treatment session) (Pain Rating)

## 2025-02-14 NOTE — PLAN OF CARE
Outpatient Physical Therapy           [place]           [] Phone: [phone]   Fax: [phone]  [place]           [x] Phone: [phone]   Fax: [phone]     To: Ron Blair MD     From: SANGITA Becerril PT,     Patient: Elia Martinez       : 1965  Diagnosis: Unilateral primary osteoarthritis, right hip [M16.11] Diagnosis: R KARY 25  Treatment Diagnosis:    Date: 2025    Physical Therapy Certification/Re-Certification Form    The following patient has been evaluated for physical therapy services and for therapy to continue, insurance requires physician review of the treatment plan initially and every 90 days. Please review the attached evaluation and/or summary of the patient's plan of care, and verify that you agree therapy should continue by signing the attached document and sending it back to our office.    Assessment:         Plan of Care/Treatment to date:  [] Therapeutic Exercise  [] Modalities:  [] Therapeutic Activity     [] Ultrasound  [] Electrical Stimulation  [] Gait Training      [] Cervical Traction [] Lumbar Traction  [] Neuromuscular Re-education    [] Cold/hotpack [] Iontophoresis   [] Instruction in HEP      [] Vasopneumatic    [] Dry Needling  [] Manual Therapy               [] Aquatic Therapy       Other:          Frequency/Duration:  # Days per week: [] 1 day # Weeks: [] 1 week [] 5 weeks     [] 2 days   [] 2 weeks [] 6 weeks     [x] 3 days   [] 3 weeks [] 7 weeks     [] 4 days   [] 4 weeks [] 8 weeks         [] 9 weeks [] 10 weeks         [] 11 weeks [] 12 weeks    Rehab Potential/Progress: [] Excellent [] Good [] Fair  [] Poor     Goals:    Patient goals: short term goal- sleep in bed  Short term goals  Time Frame for Short term goals:                   Long Term Goals  Time Frame for Long Term Goals: plan expires 3/14/25  patient will score 10/80 on LEFS indicating imporved lower extremity function with ADL/IADL  patient will be indpendent with

## 2025-02-17 ENCOUNTER — HOSPITAL ENCOUNTER (OUTPATIENT)
Dept: PHYSICAL THERAPY | Age: 60
Setting detail: THERAPIES SERIES
Discharge: HOME | End: 2025-02-17
Payer: COMMERCIAL

## 2025-02-17 PROCEDURE — 97110 THERAPEUTIC EXERCISES: CPT

## 2025-02-17 NOTE — FLOWSHEET NOTE
Outpatient Physical Therapy  [place]           [x] Phone: [phone]   Fax: [phone]  [place]           [x] Phone: [phone]   Fax: [phone]        Physical Therapy Daily Treatment Note  Date:  2025    Patient Name:  Elia Martinez    :  1965  MRN: [id number]  Restrictions/Precautions: Restrictions/Precautions: Weight Bearing     Lower Extremity Weight Bearing Restrictions  Right Lower Extremity Weight Bearing: Weight Bearing As Tolerated  Diagnosis:   Unilateral primary osteoarthritis, right hip [M16.11] Diagnosis: R KARY 25  Date of Injury/Surgery:   Treatment Diagnosis:   R leg weakness  Insurance/Certification information: [facility]  Referring Physician:  Ron Blair MD     PCP: Oneida Adames PA  Next Doctor Visit:    Plan of care signed (Y/N):    Outcome Measure:   Visit# / total visits:   2/  Pain level: 5/10   Goals:     Patient goals: short term goal- sleep in bed  Long Term Goals  Time Frame for Long Term Goals: plan expires 3/14/25  patient will score 10/80 on LEFS indicating imporved lower extremity function with ADL/IADL  patient will be indpendent with HEP  patient will walk 250 ft in 2 MWT with LRAD            Summary of Evaluation:           Subjective:  patient reports sleeping in recliner         Any changes in Ambulatory Summary Sheet?  None        Objective:  2  ft with RW          Exercises: (No more than 4 columns)   Exercise/Equipment Date 25 Date 25 Date       TUGtest/ 2 MWT    WARM UP           2 MWT          TABLE      LAQ AAROM x 5 reps X10 reps    Supine hip ABD/ADD AROM with slide board  X 15 reps    SAQ  1/2 FR x10 reps    R hip FLEXOR stretch  With L KTC             STANDING      Side step  2 x10 reps 1 x10 reps R/L    DF/PF on foam 2 x10 reps X15 reps    Gait training In clinic VCs for erect posture     Slant board  Single leg R/L x30 sec ea                           PROPRIOCEPTION

## 2025-02-19 ENCOUNTER — HOSPITAL ENCOUNTER (OUTPATIENT)
Dept: PHYSICAL THERAPY | Age: 60
Setting detail: THERAPIES SERIES
Discharge: HOME | End: 2025-02-19
Payer: COMMERCIAL

## 2025-02-19 PROCEDURE — 97110 THERAPEUTIC EXERCISES: CPT

## 2025-02-19 PROCEDURE — 97530 THERAPEUTIC ACTIVITIES: CPT

## 2025-02-19 NOTE — FLOWSHEET NOTE
Outpatient Physical Therapy  [place]           [x] Phone: [phone]   Fax: [phone]  [place]           [x] Phone: [phone]   Fax: [phone]        Physical Therapy Daily Treatment Note  Date:  2025    Patient Name:  Elia Martinez    :  1965  MRN: [id number]  Restrictions/Precautions: Restrictions/Precautions: Weight Bearing     Lower Extremity Weight Bearing Restrictions  Right Lower Extremity Weight Bearing: Weight Bearing As Tolerated  Diagnosis:   Unilateral primary osteoarthritis, right hip [M16.11] Diagnosis: R KARY 25  Date of Injury/Surgery:   Treatment Diagnosis:   R leg weakness  Insurance/Certification information: [facility]  Referring Physician:  Ron Blair MD     PCP: Oneida Adames PA  Next Doctor Visit:    Plan of care signed (Y/N):    Outcome Measure:   Visit# / total visits:   3/  Pain level: 7/10   Goals:     Patient goals: short term goal- sleep in bed  Long Term Goals  Time Frame for Long Term Goals: plan expires 3/14/25  patient will score 10/80 on LEFS indicating imporved lower extremity function with ADL/IADL  patient will be indpendent with HEP  patient will walk 250 ft in 2 MWT with LRAD            Summary of Evaluation:           Subjective:  patient reports of 7/10 pain on the anterior aspect of the hip        Any changes in Ambulatory Summary Sheet?  None        Objective:  2  ft with RW          Exercises: (No more than 4 columns)   Exercise/Equipment Date 25 Date 25 Date  25       TUGtest/ 2 MWT    WARM UP           2 MWT 2 MWT   Nustep   S15/A12  Lv3  11'   TABLE      LAQ AAROM x 5 reps X10 reps 2x10 reps   Supine hip ABD/ADD AROM with slide board  X 15 reps 15x    SAQ  1/2 FR x10 reps 1/2 FR 2x10 reps  3ct    R hip FLEXOR stretch  With L KTC W/L KTC  3x15\"            STANDING      Side step  2 x10 reps 1 x10 reps R/L Hip ABD 10x2 B   DF/PF on foam 2 x10 reps X15 reps 15x   Gait training In clinic VCs for  Nathaniel Narvaez RN called this writer. Patient is now refusing transfer to [facility].  Patient will be admitted to [facility] for SBO. MD updated.

## 2025-02-21 ENCOUNTER — HOSPITAL ENCOUNTER (OUTPATIENT)
Dept: PHYSICAL THERAPY | Age: 60
Setting detail: THERAPIES SERIES
Discharge: HOME | End: 2025-02-21
Payer: COMMERCIAL

## 2025-02-21 PROCEDURE — 97110 THERAPEUTIC EXERCISES: CPT

## 2025-02-21 PROCEDURE — 97530 THERAPEUTIC ACTIVITIES: CPT

## 2025-02-21 NOTE — FLOWSHEET NOTE
Outpatient Physical Therapy  [place]           [x] Phone: [phone]   Fax: [phone]  [place]           [x] Phone: [phone]   Fax: [phone]        Physical Therapy Daily Treatment Note  Date:  2025    Patient Name:  Elia Martinez    :  1965  MRN: [id number]  Restrictions/Precautions: Restrictions/Precautions: Weight Bearing     Lower Extremity Weight Bearing Restrictions  Right Lower Extremity Weight Bearing: Weight Bearing As Tolerated  Diagnosis:   Unilateral primary osteoarthritis, right hip [M16.11] Diagnosis: R KARY 25  Date of Injury/Surgery:   Treatment Diagnosis:   R leg weakness  Insurance/Certification information: [facility]  Referring Physician:  Ron Blair MD     PCP: Oneida Adames PA  Next Doctor Visit:    Plan of care signed (Y/N):    Outcome Measure:   Visit# / total visits:   4/  Pain level: 7/10   Goals:     Patient goals: short term goal- sleep in bed  Long Term Goals  Time Frame for Long Term Goals: plan expires 3/14/25  patient will score 10/80 on LEFS indicating imporved lower extremity function with ADL/IADL  patient will be indpendent with HEP  patient will walk 250 ft in 2 MWT with LRAD            Summary of Evaluation:           Subjective:  patient reports of 7/10 pain on the anterior aspect of the hip        Any changes in Ambulatory Summary Sheet?  None        Objective:  2  ft with RW          Exercises: (No more than 4 columns)   Exercise/Equipment Date  25            WARM UP          2 MWT 2 MWT    Nustep S15/A12  Lv3  11' S15/A12  Lv3  11'    TABLE      LAQ 2x10 reps 2x10 Increase reps as able   Supine hip ABD/ADD AROM with slide board 15x  20x Increase reps as able   SAQ 1/2 FR 2x10 reps  3ct  1/2 FR 2x10 reps  3ct     R hip FLEXOR stretch W/L KTC  3x15\" 3x30\"             STANDING      Side step  Hip ABD 10x2 B Hip ABD 10x2 B    DF/PF on foam 15x 20 x    Slant board Single leg R/L x30 sec ea Single leg R/L

## 2025-02-24 ENCOUNTER — HOSPITAL ENCOUNTER (OUTPATIENT)
Dept: PHYSICAL THERAPY | Age: 60
Setting detail: THERAPIES SERIES
Discharge: HOME | End: 2025-02-24
Payer: COMMERCIAL

## 2025-02-24 PROCEDURE — 97110 THERAPEUTIC EXERCISES: CPT

## 2025-02-24 PROCEDURE — 97530 THERAPEUTIC ACTIVITIES: CPT

## 2025-02-24 NOTE — FLOWSHEET NOTE
Outpatient Physical Therapy  [place]           [x] Phone: [phone]   Fax: [phone]  [place]           [x] Phone: [phone]   Fax: [phone]        Physical Therapy Daily Treatment Note  Date:  2025    Patient Name:  Elia Martinez    :  1965  MRN: [id number]  Restrictions/Precautions: Restrictions/Precautions: Weight Bearing     Lower Extremity Weight Bearing Restrictions  Right Lower Extremity Weight Bearing: Weight Bearing As Tolerated  Diagnosis:   Unilateral primary osteoarthritis, right hip [M16.11] Diagnosis: R KARY 25  Date of Injury/Surgery:   Treatment Diagnosis:   R leg weakness  Insurance/Certification information: [facility]  Referring Physician:  Ron Blair MD     PCP: Oneida Adames PA  Next Doctor Visit:    Plan of care signed (Y/N):    Outcome Measure:   Visit# / total visits:   5/  Pain level: 7/10   Goals:     Patient goals: short term goal- sleep in bed  Long Term Goals  Time Frame for Long Term Goals: plan expires 3/14/25  patient will score 10/80 on LEFS indicating imporved lower extremity function with ADL/IADL  patient will be indpendent with HEP  patient will walk 250 ft in 2 MWT with LRAD            Summary of Evaluation:           Subjective:  patient reports of 7/10 pain on the anterior aspect of the hip appears amb w/RW     he reports he's weaning off his pain meds due to not having many left         Any changes in Ambulatory Summary Sheet?  None        Objective:  2  ft with RW          Exercises: (No more than 4 columns)   Exercise/Equipment Date  25           WARM UP          2 MWT 2 MWT 2 MWT   Nustep S15/A12  Lv3  11' S15/A12  Lv3  11' S15/A12  Lv3  11'   TABLE      LAQ 2x10 reps 2x10 3x10    Supine hip ABD/ADD AROM with slide board 15x  20x 3x10   SAQ 1/2 FR 2x10 reps  3ct  1/2 FR 2x10 reps  3ct  1/2 FR 3x10 reps  3ct    R hip FLEXOR stretch W/L KTC  3x15\" 3x30\" 3x30\"             STANDING      Hip

## 2025-02-28 ENCOUNTER — HOSPITAL ENCOUNTER (OUTPATIENT)
Dept: PHYSICAL THERAPY | Age: 60
Setting detail: THERAPIES SERIES
Discharge: HOME | End: 2025-02-28
Payer: COMMERCIAL

## 2025-02-28 PROCEDURE — 97110 THERAPEUTIC EXERCISES: CPT

## 2025-02-28 PROCEDURE — 97530 THERAPEUTIC ACTIVITIES: CPT

## 2025-02-28 NOTE — FLOWSHEET NOTE
Outpatient Physical Therapy  [place]           [x] Phone: [phone]   Fax: [phone]  [place]           [x] Phone: [phone]   Fax: [phone]        Physical Therapy Daily Treatment Note  Date:  2025    Patient Name:  Elia Martinez    :  1965  MRN: [id number]  Restrictions/Precautions: Restrictions/Precautions: Weight Bearing     Lower Extremity Weight Bearing Restrictions  Right Lower Extremity Weight Bearing: Weight Bearing As Tolerated  Diagnosis:   Unilateral primary osteoarthritis, right hip [M16.11] Diagnosis: R KARY 25  (anterior approach)   Date of Injury/Surgery:  25  Treatment Diagnosis:   R leg weakness  Insurance/Certification information: [facility]  Referring Physician:  Ron Blair MD     PCP: Oneida Adames PA  Next Doctor Visit:    Plan of care signed (Y/N):    Outcome Measure: HOOS    Visit# / total visits:   6/  Pain level: 7/10   Goals:     Patient goals: short term goal- sleep in bed  Long Term Goals  Time Frame for Long Term Goals: plan expires 3/14/25  patient will score 10/80 on LEFS indicating imporved lower extremity function with ADL/IADL  patient will be indpendent with HEP  patient will walk 250 ft in 2 MWT with LRAD            Summary of Evaluation:           Subjective:  patient reports of 7/10 pain on the anterior aspect of the hip appears amb w/RW. Slept in the bed with 4-5 hours.         Any changes in Ambulatory Summary Sheet?  None      Objective:       Due to back pain, reclined table for greater comfort.   Min-mod A with hooklying marches secondary to weakness.   Good technique with all other exercises.   Mod A with R LE with getting LE onto table      Exercises: (No more than 4 columns)   Exercise/Equipment Date  25  #6            WARM UP           2 MWT 2 MWT 2 MWT    Nustep S15/A12  Lv3  11' S15/A12  Lv3  11' S15/A12  Lv3  11' S15/A12  Lv3  5'   TABLE       LAQ 2x10 reps 2x10 3x10  4#

## 2025-03-03 ENCOUNTER — HOSPITAL ENCOUNTER (OUTPATIENT)
Dept: PHYSICAL THERAPY | Age: 60
Setting detail: THERAPIES SERIES
Discharge: HOME | End: 2025-03-03
Payer: COMMERCIAL

## 2025-03-03 PROCEDURE — 97110 THERAPEUTIC EXERCISES: CPT

## 2025-03-03 PROCEDURE — 97530 THERAPEUTIC ACTIVITIES: CPT

## 2025-03-03 NOTE — FLOWSHEET NOTE
table       Iso hip ADD    10x2  2\" 2x10  2\"   Hooklying marchmaria fernanda    Reclined table 10x2  Min A Standing  10x2   Clamshells    Hooklying GTB 10x2 Hooklying GTB 10x2                 STANDING       Hip ABD Hip ABD 10x2 B 2x10 B 10x2 10x2   DF/PF on foam 20 x 20x     Slant board Single leg R/L x30 sec ea Single leg R/L  1' ea                               PROPRIOCEPTION       Wobbleboard    20\"x3 lateral  20\"x3 lateral                                MODALITIES                         Other Therapeutic Activities/Education:        Home Exercise Program:    Added adilia with FWW 2/28/25.       Manual Treatments:        Modalities:        Communication with other providers:        Assessment:  (Response towards treatment session) (Pain Rating) Tolerates exercises better with table reclined.  Rated his pain 7/10 after treatment.     Pt tolerated  treatment without any adverse reactions or complications this date. Added exercises and resistance and tolerated well. Most weakness into hip flexion but expected at this time. Will assess next visit and progress as able. Pt would continue to benefit from skilled therapy interventions to address remaining impairments, improve mobility and strength,  and progress toward goal completion and prepare for d/c including finalizing HEP ;  while reducing risk for re-injury or further decline.      Pain complaints after session  7/10       Plan for Next Session: target hip all dir, standing activities with gait and neuro as able.        Time In / Time Out:    1635/1713     If [facility] Please Indicate Time In/Out/Total Time  CPT Code Time in Time out Total Time   PT eval                   Therapeutic exs  1635 1700 25   Therapeutic activity   1700 1713 13                        Total for session             Timed Code/Total Treatment Minutes:    25' TE, 13' TA      Next Progress Note due:        Plan of Care Interventions:  [x] Therapeutic Exercise  [x] Modalities:  [x] Therapeutic Activity

## 2025-03-07 ENCOUNTER — HOSPITAL ENCOUNTER (OUTPATIENT)
Dept: PHYSICAL THERAPY | Age: 60
Discharge: HOME | End: 2025-03-07

## 2025-03-10 ENCOUNTER — HOSPITAL ENCOUNTER (OUTPATIENT)
Dept: PHYSICAL THERAPY | Age: 60
Setting detail: THERAPIES SERIES
Discharge: HOME | End: 2025-03-10
Payer: COMMERCIAL

## 2025-03-10 PROCEDURE — 97110 THERAPEUTIC EXERCISES: CPT

## 2025-03-10 PROCEDURE — 97530 THERAPEUTIC ACTIVITIES: CPT

## 2025-03-10 NOTE — FLOWSHEET NOTE
Outpatient Physical Therapy  [place]           [x] Phone: [phone]   Fax: [phone]  [place]           [x] Phone: [phone]   Fax: [phone]        Physical Therapy Daily Treatment Note  Date:  3/10/2025    Patient Name:  Elia Martinez    :  1965  MRN: [id number]  Restrictions/Precautions: Restrictions/Precautions: Weight Bearing     Lower Extremity Weight Bearing Restrictions  Right Lower Extremity Weight Bearing: Weight Bearing As Tolerated  Diagnosis:   Unilateral primary osteoarthritis, right hip [M16.11] Diagnosis: R KARY 25  (anterior approach)   Date of Injury/Surgery:  25  Treatment Diagnosis:   R leg weakness  Insurance/Certification information: [facility]  Referring Physician:  Ron Blair MD     PCP: Oneida Adames PA  Next Doctor Visit:    Plan of care signed (Y/N):    Outcome Measure: HOOS    Visit# / total visits:   8/  Pain level: 6/10   Goals:     Patient goals: short term goal- sleep in bed  Long Term Goals  Time Frame for Long Term Goals: plan expires 3/14/25  patient will score 10/80 on LEFS indicating imporved lower extremity function with ADL/IADL  patient will be indpendent with HEP  patient will walk 250 ft in 2 MWT with LRAD            Summary of Evaluation:           Subjective:  Patient rates his hip pain a strong 6/10 today.  Sleeping is getting better, but still not great.  Had a follow up with the surgeon on Friday.  Had an injection in his back on Wednesday.        Any changes in Ambulatory Summary Sheet?  None      Objective:       Patient continues to use hands to get leg up onto table.        Exercises: (No more than 4 columns)   Exercise/Equipment 25  #6 3/3/2025 3/10/2025           WARM UP               Nustep S15/A12  Lv3  5' S15/A12  Lv3  10' S15/A12  Lv3  10'   TABLE      LAQ 4# 10x2 4# 10x2 4# 10x3  3\"   Supine hip ABD/ADD AROM with slide board      SAQ   Med Roll 20x5\"  Small 10x5\"   R hip FLEXOR stretch 30\"x3 on

## 2025-03-12 ENCOUNTER — HOSPITAL ENCOUNTER (OUTPATIENT)
Dept: PHYSICAL THERAPY | Age: 60
Setting detail: THERAPIES SERIES
Discharge: HOME | End: 2025-03-12
Payer: COMMERCIAL

## 2025-03-12 PROCEDURE — 97530 THERAPEUTIC ACTIVITIES: CPT

## 2025-03-12 PROCEDURE — 97110 THERAPEUTIC EXERCISES: CPT

## 2025-03-12 NOTE — FLOWSHEET NOTE
Aquatic Therapy              Electronically signed by:  Radha Regan, BRUCE   3/12/2025, 4:08 PM    3/12/2025 4:08 PM

## 2025-03-14 ENCOUNTER — HOSPITAL ENCOUNTER (OUTPATIENT)
Dept: PHYSICAL THERAPY | Age: 60
Setting detail: THERAPIES SERIES
Discharge: HOME | End: 2025-03-14
Payer: COMMERCIAL

## 2025-03-14 PROCEDURE — 97530 THERAPEUTIC ACTIVITIES: CPT

## 2025-03-14 PROCEDURE — 97110 THERAPEUTIC EXERCISES: CPT

## 2025-03-14 NOTE — FLOWSHEET NOTE
Outpatient Physical Therapy  [place]           [x] Phone: [phone]   Fax: [phone]  [place]           [x] Phone: [phone]   Fax: [phone]        Physical Therapy Daily Treatment Note  Date:  3/14/2025    Patient Name:  Elia Martinez    :  1965  MRN: [id number]  Restrictions/Precautions: Restrictions/Precautions: Weight Bearing     Lower Extremity Weight Bearing Restrictions  Right Lower Extremity Weight Bearing: Weight Bearing As Tolerated  Diagnosis:   Unilateral primary osteoarthritis, right hip [M16.11] Diagnosis: R KARY 25  (anterior approach)   Date of Injury/Surgery:  25  Treatment Diagnosis:   R leg weakness  Insurance/Certification information: [facility]  Referring Physician:  Ron Blair MD     PCP: Oneida Adames PA  Next Doctor Visit:    Plan of care signed (Y/N):    Outcome Measure: HOOS    Visit# / total visits:  10/12   Pain level:  7/10   Goals:     Patient goals: short term goal- sleep in bed  Long Term Goals  Time Frame for Long Term Goals: plan expires 3/14/25  patient will score 10/80 on LEFS indicating imporved lower extremity function with ADL/IADL  patient will be indpendent with HEP  patient will walk 250 ft in 2 MWT with LRAD            Summary of Evaluation:           Subjective:  Patient rates his hip pain a strong 7/10 today.       Any changes in Ambulatory Summary Sheet?  None      Objective:       Patient continues to use hands to get leg up onto table.        Exercises: (No more than 4 columns)   Exercise/Equipment 3/10/2025 3/12/25   #9 3/14/25             WARM UP                 Nustep S15/A12  Lv3  10' S15/A12  Lv3  10' S15/A12  Lv3  10'    TABLE       LAQ 4# 10x3  3\" 4# 10x3  3\" 4# 10x3  5\"    Supine hip ABD/ADD AROM with slide board       SAQ Med Roll 20x5\"  Small 10x5\" Med Roll 20x5\"  Small 10x5\" Med Roll 20x5\"  Small 10x5\"    R hip FLEXOR stretch         Iso hip ADD  2x10  2\" 2x10 2\" 2x10  5\"    Hooklying marchmaria fernanda

## 2025-03-24 ENCOUNTER — HOSPITAL ENCOUNTER (OUTPATIENT)
Dept: PHYSICAL THERAPY | Age: 60
Setting detail: THERAPIES SERIES
Discharge: HOME | End: 2025-03-24
Payer: COMMERCIAL

## 2025-03-24 PROCEDURE — 97530 THERAPEUTIC ACTIVITIES: CPT

## 2025-03-24 PROCEDURE — 97110 THERAPEUTIC EXERCISES: CPT

## 2025-03-24 NOTE — FLOWSHEET NOTE
SAQ Med Roll 20x5\"  Small 10x5\" Med Roll 20x5\"  Small 10x5\"    R hip FLEXOR stretch        Iso hip ADD  2x10  5\" 2x10  5\"    Hooklying marches  Standing  10x2 Standing  10x2    Clamshells  Hooklying GTB 10x2  5\" Hooklying GTB 10x2  5\"                STANDING      Hip ABD 2x10  2x10    DF/PF on foam      Slant board 1' 1 min                            PROPRIOCEPTION      Wobbleboard  1' sagit  1' sagital                            MODALITIES                      Other Therapeutic Activities/Education:        Home Exercise Program:    Added adilia with FWW 2/28/25.       Manual Treatments:        Modalities:        Communication with other providers:        Assessment:  (Response towards treatment session) (Pain Rating) Patient rated his hip pain 6/10 after treatment.  Continued to have the pain down the outside of the right LE       Pt tolerated  treatment without any adverse reactions or complications this date. Added exercises and resistance and tolerated well. Most weakness into hip flexion but expected at this time. Will assess next visit and progress as able. Pt would continue to benefit from skilled therapy interventions to address remaining impairments, improve mobility and strength,  and progress toward goal completion and prepare for d/c including finalizing HEP ;  while reducing risk for re-injury or further decline.          Plan for Next Session: target hip all dir, standing activities with gait and neuro as able.        Time In / Time Out:      1430/1509     If [facility] Please Indicate Time In/Out/Total Time  CPT Code Time in Time out Total Time   PT eval                   Therapeutic exs  1430 1459 29   Therapeutic activity   1459 1509 10                        Total for session             Timed Code/Total Treatment Minutes:  39  2te 1ta          Next Progress Note due:        Plan of Care Interventions:  [x] Therapeutic Exercise  [x] Modalities:  [x] Therapeutic Activity     [] Ultrasound  []

## 2025-03-26 ENCOUNTER — HOSPITAL ENCOUNTER (OUTPATIENT)
Dept: PHYSICAL THERAPY | Age: 60
Setting detail: THERAPIES SERIES
Discharge: HOME | End: 2025-03-26
Payer: COMMERCIAL

## 2025-03-26 PROCEDURE — 97110 THERAPEUTIC EXERCISES: CPT

## 2025-03-26 PROCEDURE — 97530 THERAPEUTIC ACTIVITIES: CPT

## 2025-03-26 NOTE — FLOWSHEET NOTE
Outpatient Physical Therapy  [place]           [x] Phone: [phone]   Fax: [phone]  [place]           [x] Phone: [phone]   Fax: [phone]        Physical Therapy Daily Treatment Note  Date:  3/26/2025    Patient Name:  Elia Martinez    :  1965  MRN: [id number]  Restrictions/Precautions: Restrictions/Precautions: Weight Bearing     Lower Extremity Weight Bearing Restrictions  Right Lower Extremity Weight Bearing: Weight Bearing As Tolerated  Diagnosis:   Unilateral primary osteoarthritis, right hip [M16.11] Diagnosis: R KARY 25  (anterior approach)   Date of Injury/Surgery:  25  Treatment Diagnosis:   R leg weakness  Insurance/Certification information: [facility]  Referring Physician:  Ron Blair MD     PCP: Oneida Adames PA  Next Doctor Visit:    Plan of care signed (Y/N):    Outcome Measure: HOOS    Visit# / total visits:     Pain level:  7/10   Goals:     Patient goals: short term goal- sleep in bed  Long Term Goals  Time Frame for Long Term Goals: plan expires   patient will score 10/80 on LEFS indicating imporved lower extremity function with ADL/IADL  patient will be indpendent with HEP  patient will walk 250 ft in 2 MWT with LRAD            Summary of Evaluation:           Subjective:  Patient rates his hip pain a strong 7/10 today.        Any changes in Ambulatory Summary Sheet?  None      Objective:   239ft 2min walk test     LEFS  17/80    Patient continues to use hands to get leg up onto table.        Exercises: (No more than 4 columns)   Exercise/Equipment 3/14/25 3/24/2025 3/26/25        Needs 2 MWT and LEFS   WARM UP               Nustep S15/A12  Lv3  10' S15/A12  Lv3  10' S15/A12  Lv3  10'   TABLE      LAQ 4# 10x3  5\" 4# 10x3  5\" 4# 10x3  5\"   Supine hip ABD/ADD AROM with slide board      SAQ Med Roll 20x5\"  Small 10x5\" Med Roll 20x5\"  Small 10x5\" Med Roll 20x5\"  Small 20x5\"   R hip FLEXOR stretch        Iso hip ADD  2x10  5\" 2x10

## 2025-03-29 NOTE — PROGRESS NOTES
Physical Therapy      Outpatient Physical Therapy           [place]           [] Phone: [phone]   Fax: [phone]  [place]           [x] Phone: [phone]   Fax: [phone]      To: Ron Blair MD     From: SANGITA Becerril PT,     Patient: Elia Martinez                    : 1965  Diagnosis:  Unilateral primary osteoarthritis, right hip [M16.11]        Treatment Diagnosis:       Date: 3/29/2025  [x]  Progress Note                []  Discharge Note    Evaluation Date:  25   Total Visits to date:   12 Cancels/No-shows to date:      Subjective:  3/26/25 Patient rates his hip pain a strong 7/10 today.          Plan of Care/Treatment to date:  [x] Therapeutic Exercise    [] Modalities:  [x] Therapeutic Activity     [] Ultrasound  [] Electrical Stimulation  [x] Gait Training      [] Cervical Traction   [] Lumbar Traction  [x] Neuromuscular Re-education  [] Cold/hotpack [] Iontophoresis  [x] Instruction in HEP      Other:  [x] Manual Therapy       []  Vasopneumatic  [] Aquatic Therapy       []   Dry Needle Therapy                      Objective/Significant Findings At Last Visit/Comments:     239ft 2min walk test     LEFS  17/80       Assessment:   @ session 2   2  ft with RW       Goal Status:  [] Achieved [x] Partially Achieved  [] Not Achieved   Patient goals: short term goal- sleep in bed  Long Term Goals  Time Frame for Long Term Goals: plan expires   patient will score 10/80 on LEFS indicating imporved lower extremity function with ADL/IADL-met  patient will be indpendent with HEP  patient will walk 250 ft in 2 MWT with LRAD-progressing  to        Rehab Potential: [] Excellent [x] Good [] Fair  [] Poor         Patient Status: [] Continue per initial plan of Care     [] Patient now discharged     [x] Additional visits requested, Please re-certify for additional visits:      Requested frequency/duration:  3/week for 4weeks    If we are requesting more visits, we fully anticipate

## 2025-04-11 ENCOUNTER — OFFICE VISIT (OUTPATIENT)
Dept: CARDIOLOGY CLINIC | Age: 60
End: 2025-04-11

## 2025-04-11 VITALS
DIASTOLIC BLOOD PRESSURE: 94 MMHG | HEIGHT: 76 IN | SYSTOLIC BLOOD PRESSURE: 138 MMHG | HEART RATE: 78 BPM | WEIGHT: 315 LBS | BODY MASS INDEX: 38.36 KG/M2

## 2025-04-11 DIAGNOSIS — E78.5 DYSLIPIDEMIA: ICD-10-CM

## 2025-04-11 DIAGNOSIS — G47.33 OSA ON CPAP: ICD-10-CM

## 2025-04-11 DIAGNOSIS — I48.0 PAROXYSMAL ATRIAL FIBRILLATION (HCC): ICD-10-CM

## 2025-04-11 DIAGNOSIS — I10 ESSENTIAL (PRIMARY) HYPERTENSION: Primary | ICD-10-CM

## 2025-04-11 PROBLEM — M51.369 DEGENERATION OF INTERVERTEBRAL DISC OF LUMBAR REGION WITH OSTEOPHYTE OF LUMBAR VERTEBRA: Status: ACTIVE | Noted: 2024-08-08

## 2025-04-11 PROBLEM — Z96.641 PRESENCE OF RIGHT ARTIFICIAL HIP JOINT: Status: ACTIVE | Noted: 2025-03-28

## 2025-04-11 PROBLEM — N20.0 CALCULUS OF KIDNEY: Status: ACTIVE | Noted: 2024-07-16

## 2025-04-11 PROBLEM — I42.9 CARDIOMYOPATHY: Status: ACTIVE | Noted: 2020-12-02

## 2025-04-11 PROBLEM — M25.78 DEGENERATION OF INTERVERTEBRAL DISC OF LUMBAR REGION WITH OSTEOPHYTE OF LUMBAR VERTEBRA: Status: ACTIVE | Noted: 2024-08-08

## 2025-04-11 PROBLEM — M54.16 LUMBAR RADICULOPATHY: Status: ACTIVE | Noted: 2024-05-03

## 2025-04-11 PROBLEM — E66.01 OBESITY, MORBID, BMI 40.0-49.9: Status: ACTIVE | Noted: 2024-02-19

## 2025-04-11 PROBLEM — N40.0 BENIGN PROSTATIC HYPERPLASIA: Status: ACTIVE | Noted: 2024-07-16

## 2025-04-11 RX ORDER — MELOXICAM 15 MG/1
TABLET ORAL
COMMUNITY
Start: 2025-01-20

## 2025-04-11 NOTE — PROGRESS NOTES
CARDIOLOGY  NOTE    2025    Elia Martinez (:  1965) is a 59 y.o. male,an established patient with Dr. Velasco, here for evaluation of the following chief complaint(s):  Hyperlipidemia and Follow-up (3 month follow up/No chest pain, SOB dizziness, swelling or palpitations)      SUBJECTIVE/OBJECTIVE:  History of Present Illness  The patient presents for evaluation of atrial fibrillation, blood pressure management, weight management, and sleep apnea.    Persistent numbness in the right leg, extending from the calf to the foot, has been experienced for over a year. Massage therapy is received once or twice a week, providing some relief. Right hip replacement surgery was performed 7 weeks ago, and left knee surgery is scheduled for early next month. There is a history of kidney stones.    Sleep apnea is managed with a CPAP machine, which has been malfunctioning due to issues with the heating element in the humidifier. Despite previous repairs, the machine's functionality was short-lived, and consistent use has not occurred for the past 3 years.    No palpitations are reported. Carvedilol is administered twice daily.    Weight was 309 pounds prior to surgery. A healthy diet is adhered to, incorporating salads and fruits for breakfast, cottage cheese, scrambled eggs, and fruits. Lunch typically consists of a salad, and dinner varies. Interest in chair exercises for individuals over 50 years of age has been expressed.    Discontinuation of Tessalon Perles, which was used since 2024, is noted. A course of antibiotics was completed in 2024. Multivitamins are not currently taken. Zofran is available for use as needed, but it was only used once post-surgery. Oxycodone is occasionally used for pain management, having been taken for 2 to 3 weeks immediately following surgery. Tramadol was previously used during the rehabilitation period but was discontinued last Thursday. Aspirin is taken daily, and